# Patient Record
Sex: FEMALE | Race: WHITE | Employment: OTHER | URBAN - NONMETROPOLITAN AREA
[De-identification: names, ages, dates, MRNs, and addresses within clinical notes are randomized per-mention and may not be internally consistent; named-entity substitution may affect disease eponyms.]

---

## 2017-02-03 ENCOUNTER — OFFICE VISIT (OUTPATIENT)
Dept: PSYCHIATRY | Age: 72
End: 2017-02-03

## 2017-02-03 DIAGNOSIS — F31.81 BIPOLAR II DISORDER IN FULL REMISSION (HCC): Primary | ICD-10-CM

## 2017-02-03 PROCEDURE — 99213 OFFICE O/P EST LOW 20 MIN: CPT | Performed by: PSYCHIATRY & NEUROLOGY

## 2017-02-08 ENCOUNTER — TELEPHONE (OUTPATIENT)
Dept: ONCOLOGY | Age: 72
End: 2017-02-08

## 2017-02-13 ENCOUNTER — OFFICE VISIT (OUTPATIENT)
Dept: ONCOLOGY | Age: 72
End: 2017-02-13

## 2017-02-13 VITALS
HEIGHT: 70 IN | WEIGHT: 180.8 LBS | TEMPERATURE: 98 F | BODY MASS INDEX: 25.88 KG/M2 | DIASTOLIC BLOOD PRESSURE: 90 MMHG | HEART RATE: 84 BPM | OXYGEN SATURATION: 98 % | RESPIRATION RATE: 18 BRPM | SYSTOLIC BLOOD PRESSURE: 187 MMHG

## 2017-02-13 DIAGNOSIS — C50.412 MALIGNANT NEOPLASM OF UPPER-OUTER QUADRANT OF LEFT FEMALE BREAST (HCC): Primary | ICD-10-CM

## 2017-02-13 PROCEDURE — 99213 OFFICE O/P EST LOW 20 MIN: CPT | Performed by: INTERNAL MEDICINE

## 2017-03-13 ENCOUNTER — OFFICE VISIT (OUTPATIENT)
Dept: ONCOLOGY | Age: 72
End: 2017-03-13

## 2017-03-13 VITALS
SYSTOLIC BLOOD PRESSURE: 159 MMHG | OXYGEN SATURATION: 97 % | RESPIRATION RATE: 18 BRPM | BODY MASS INDEX: 26.23 KG/M2 | HEIGHT: 70 IN | TEMPERATURE: 97.3 F | HEART RATE: 72 BPM | DIASTOLIC BLOOD PRESSURE: 94 MMHG | WEIGHT: 183.2 LBS

## 2017-03-13 DIAGNOSIS — C50.412 MALIGNANT NEOPLASM OF UPPER-OUTER QUADRANT OF LEFT FEMALE BREAST (HCC): Primary | ICD-10-CM

## 2017-03-13 PROCEDURE — 99214 OFFICE O/P EST MOD 30 MIN: CPT | Performed by: INTERNAL MEDICINE

## 2017-03-21 DIAGNOSIS — Z12.31 ENCOUNTER FOR SCREENING MAMMOGRAM FOR HIGH-RISK PATIENT: ICD-10-CM

## 2017-03-21 DIAGNOSIS — Z85.3 HISTORY OF BREAST CANCER: Primary | ICD-10-CM

## 2017-07-25 DIAGNOSIS — Z12.31 ENCOUNTER FOR SCREENING MAMMOGRAM FOR HIGH-RISK PATIENT: ICD-10-CM

## 2017-07-25 DIAGNOSIS — Z85.3 HISTORY OF BREAST CANCER: ICD-10-CM

## 2017-08-03 ENCOUNTER — OFFICE VISIT (OUTPATIENT)
Dept: PSYCHIATRY | Age: 72
End: 2017-08-03
Payer: COMMERCIAL

## 2017-08-03 DIAGNOSIS — F31.81 BIPOLAR II DISORDER IN FULL REMISSION (HCC): Primary | ICD-10-CM

## 2017-08-03 PROCEDURE — 99212 OFFICE O/P EST SF 10 MIN: CPT | Performed by: PSYCHIATRY & NEUROLOGY

## 2017-08-03 RX ORDER — ARIPIPRAZOLE 10 MG/1
10 TABLET ORAL DAILY
Qty: 90 TABLET | Refills: 3 | Status: SHIPPED | OUTPATIENT
Start: 2017-08-03 | End: 2018-08-01 | Stop reason: SDUPTHER

## 2017-09-11 ENCOUNTER — OFFICE VISIT (OUTPATIENT)
Dept: ONCOLOGY | Age: 72
End: 2017-09-11
Payer: COMMERCIAL

## 2017-09-11 ENCOUNTER — HOSPITAL ENCOUNTER (OUTPATIENT)
Dept: INFUSION THERAPY | Age: 72
Discharge: HOME | End: 2017-09-11
Payer: COMMERCIAL

## 2017-09-11 VITALS
HEIGHT: 70 IN | TEMPERATURE: 97.7 F | SYSTOLIC BLOOD PRESSURE: 156 MMHG | BODY MASS INDEX: 25.17 KG/M2 | RESPIRATION RATE: 18 BRPM | DIASTOLIC BLOOD PRESSURE: 92 MMHG | WEIGHT: 175.8 LBS | OXYGEN SATURATION: 98 % | HEART RATE: 69 BPM

## 2017-09-11 DIAGNOSIS — C50.411 MALIGNANT NEOPLASM OF UPPER-OUTER QUADRANT OF RIGHT FEMALE BREAST (HCC): Primary | ICD-10-CM

## 2017-09-11 DIAGNOSIS — C50.412 MALIGNANT NEOPLASM OF UPPER-OUTER QUADRANT OF LEFT FEMALE BREAST (HCC): ICD-10-CM

## 2017-09-11 LAB
ALBUMIN SERPL-MCNC: 4.5 G/DL (ref 3.5–5.1)
ALP BLD-CCNC: 99 U/L (ref 38–126)
ALT SERPL-CCNC: 14 U/L (ref 11–66)
AST SERPL-CCNC: 17 U/L (ref 5–40)
BASINOPHIL, AUTOMATED: 1 % (ref 0–12)
BILIRUB SERPL-MCNC: 0.5 MG/DL (ref 0.3–1.2)
BILIRUBIN DIRECT: < 0.2 MG/DL (ref 0–0.3)
BUN, WHOLE BLOOD: 11 MG/DL (ref 8–26)
CHLORIDE, WHOLE BLOOD: 99 MEQ/L (ref 98–109)
CREATININE, WHOLE BLOOD: 0.6 MG/DL (ref 0.5–1.2)
EOSINOPHILS RELATIVE PERCENT: 3 % (ref 0–12)
GFR, ESTIMATED ,CON: > 90 ML/MIN/1.73M2
GLUCOSE, WHOLE BLOOD: 91 MG/DL (ref 70–108)
HCT VFR BLD CALC: 42.8 % (ref 37–47)
HEMOGLOBIN: 14.7 GM/DL (ref 12–16)
IONIZED CALCIUM, WHOLE BLOOD: 1.24 MMOL/L (ref 1.12–1.32)
LYMPHOCYTES # BLD: 28 % (ref 15–47)
MCH RBC QN AUTO: 32.5 PG (ref 27–31)
MCHC RBC AUTO-ENTMCNC: 34.4 GM/DL (ref 33–37)
MCV RBC AUTO: 95 FL (ref 81–99)
MONOCYTES: 8 % (ref 0–12)
PDW BLD-RTO: 11.2 % (ref 11.5–14.5)
PLATELET # BLD: 179 THOU/MM3 (ref 130–400)
PMV BLD AUTO: 8.7 MCM (ref 7.4–10.4)
POTASSIUM, WHOLE BLOOD: 4.3 MEQ/L (ref 3.5–4.9)
RBC # BLD: 4.52 MILL/MM3 (ref 4.2–5.4)
SEG NEUTROPHILS: 61 % (ref 43–75)
SODIUM, WHOLE BLOOD: 138 MEQ/L (ref 138–146)
TOTAL CO2, WHOLE BLOOD: 29 MEQ/L (ref 23–33)
TOTAL PROTEIN: 7.3 G/DL (ref 6.1–8)
WBC # BLD: 5.3 THOU/MM3 (ref 4.8–10.8)

## 2017-09-11 PROCEDURE — 99214 OFFICE O/P EST MOD 30 MIN: CPT | Performed by: INTERNAL MEDICINE

## 2017-09-11 PROCEDURE — 99211 OFF/OP EST MAY X REQ PHY/QHP: CPT

## 2017-09-11 PROCEDURE — 36415 COLL VENOUS BLD VENIPUNCTURE: CPT

## 2017-09-11 PROCEDURE — 85025 COMPLETE CBC W/AUTO DIFF WBC: CPT

## 2017-09-11 PROCEDURE — 80076 HEPATIC FUNCTION PANEL: CPT

## 2017-09-11 PROCEDURE — 80047 BASIC METABLC PNL IONIZED CA: CPT

## 2017-09-11 RX ORDER — AMLODIPINE BESYLATE 5 MG/1
5 TABLET ORAL DAILY
COMMUNITY
Start: 2017-07-12

## 2018-02-01 ENCOUNTER — OFFICE VISIT (OUTPATIENT)
Dept: PSYCHIATRY | Age: 73
End: 2018-02-01
Payer: COMMERCIAL

## 2018-02-01 DIAGNOSIS — F31.81 BIPOLAR II DISORDER IN FULL REMISSION (HCC): Primary | ICD-10-CM

## 2018-02-01 PROCEDURE — 99212 OFFICE O/P EST SF 10 MIN: CPT | Performed by: PSYCHIATRY & NEUROLOGY

## 2018-02-01 NOTE — PROGRESS NOTES
Chief Complaint   Patient presents with    [address] [facility] returned after six months to follow-up on type II bipolar disorder. As previously, she reports that she is symptom free. She remains on the Abilify 10 mg daily, has no side effect complaints, and good benefit. We will simply continue as is. She had no questions. She has ample refills. She does complain of word finding difficulty. This shows up in conversations. It is a relatively recent complaint. She does not have other areas of deficit functioning as far as I can tell. I did not see any examples today.     Mental Status Examination    Level of consciousness:  within normal limits  Appearance:  well-appearing, street clothes, in chair, good grooming and good hygiene  Behavior/Motor:  no abnormalities noted  Attitude toward examiner:  cooperative, attentive and good eye contact  Speech:  spontaneous, normal rate, normal volume and well articulated  Mood:  euthymic  Affect:  mood congruent  Thought processes:  linear, goal directed and coherent  Thought content:  Homocidal ideation: none  Suicidal Ideation:  denies suicidal ideation  Delusions:  no evidence of delusions  Perceptual Disturbance:  denies any perceptual disturbance  Cognition:  oriented to person, place, and time  Concentration succeeded  Memory intact  Fund of knowledge:  good  Abstract thinking:  good  Insight:  good  Judgment:  good    DIAGNOSTIC IMPRESSION  Bipolar II remission    Plan  No changes  Current Outpatient Prescriptions   Medication Sig Dispense Refill    amLODIPine (NORVASC) 5 MG tablet Take 5 mg by mouth daily       ARIPiprazole (ABILIFY) 10 MG tablet Take 1 tablet by mouth daily 90 tablet 3    letrozole (FEMARA) 2.5 MG tablet TAKE 1 TABLET EVERY DAY 90 tablet 3    letrozole (FEMARA) 2.5 MG tablet TAKE 1 TABLET EVERY DAY 90 tablet 3    levothyroxine (SYNTHROID) 88 MCG tablet Take 88 mcg by mouth Daily      simvastatin (ZOCOR) 40 MG

## 2018-02-26 RX ORDER — LETROZOLE 2.5 MG/1
TABLET, FILM COATED ORAL
Qty: 90 TABLET | Refills: 0 | Status: SHIPPED | OUTPATIENT
Start: 2018-02-26 | End: 2018-03-05 | Stop reason: SDUPTHER

## 2018-03-05 ENCOUNTER — HOSPITAL ENCOUNTER (OUTPATIENT)
Dept: INFUSION THERAPY | Age: 73
Discharge: HOME | End: 2018-03-05
Payer: COMMERCIAL

## 2018-03-05 ENCOUNTER — OFFICE VISIT (OUTPATIENT)
Dept: ONCOLOGY | Age: 73
End: 2018-03-05
Payer: COMMERCIAL

## 2018-03-05 VITALS
HEIGHT: 70 IN | RESPIRATION RATE: 18 BRPM | HEART RATE: 65 BPM | DIASTOLIC BLOOD PRESSURE: 88 MMHG | WEIGHT: 175.4 LBS | SYSTOLIC BLOOD PRESSURE: 146 MMHG | TEMPERATURE: 97.5 F | BODY MASS INDEX: 25.11 KG/M2 | OXYGEN SATURATION: 99 %

## 2018-03-05 DIAGNOSIS — C50.911 PRIMARY MALIGNANT NEOPLASM OF RIGHT BREAST WITH STAGE 3 NODAL METASTASIS PER AMERICAN JOINT COMMITTEE ON CANCER 7TH EDITION (N3) (HCC): Primary | ICD-10-CM

## 2018-03-05 DIAGNOSIS — I10 HYPERTENSION, UNSPECIFIED TYPE: ICD-10-CM

## 2018-03-05 DIAGNOSIS — C77.9 PRIMARY MALIGNANT NEOPLASM OF RIGHT BREAST WITH STAGE 3 NODAL METASTASIS PER AMERICAN JOINT COMMITTEE ON CANCER 7TH EDITION (N3) (HCC): Primary | ICD-10-CM

## 2018-03-05 DIAGNOSIS — C50.411 MALIGNANT NEOPLASM OF UPPER-OUTER QUADRANT OF RIGHT FEMALE BREAST (HCC): ICD-10-CM

## 2018-03-05 DIAGNOSIS — Z79.811 ENCOUNTER FOR MONITORING AROMATASE INHIBITOR THERAPY: ICD-10-CM

## 2018-03-05 DIAGNOSIS — Z51.81 ENCOUNTER FOR MONITORING AROMATASE INHIBITOR THERAPY: ICD-10-CM

## 2018-03-05 LAB
ALBUMIN SERPL-MCNC: 4.5 G/DL (ref 3.5–5.1)
ALP BLD-CCNC: 107 U/L (ref 38–126)
ALT SERPL-CCNC: 12 U/L (ref 11–66)
AST SERPL-CCNC: 18 U/L (ref 5–40)
BASINOPHIL, AUTOMATED: 0 % (ref 0–12)
BILIRUB SERPL-MCNC: 0.5 MG/DL (ref 0.3–1.2)
BILIRUBIN DIRECT: < 0.2 MG/DL (ref 0–0.3)
BUN, WHOLE BLOOD: 10 MG/DL (ref 8–26)
CHLORIDE, WHOLE BLOOD: 99 MEQ/L (ref 98–109)
CREATININE, WHOLE BLOOD: 0.6 MG/DL (ref 0.5–1.2)
EOSINOPHILS RELATIVE PERCENT: 2 % (ref 0–12)
GFR, ESTIMATED ,CON: > 90 ML/MIN/1.73M2
GLUCOSE, WHOLE BLOOD: 94 MG/DL (ref 70–108)
HCT VFR BLD CALC: 44.2 % (ref 37–47)
HEMOGLOBIN: 15 GM/DL (ref 12–16)
IONIZED CALCIUM, WHOLE BLOOD: 1.16 MMOL/L (ref 1.12–1.32)
LYMPHOCYTES # BLD: 27 % (ref 15–47)
MCH RBC QN AUTO: 32.2 PG (ref 27–31)
MCHC RBC AUTO-ENTMCNC: 33.9 GM/DL (ref 33–37)
MCV RBC AUTO: 95 FL (ref 81–99)
MONOCYTES: 6 % (ref 0–12)
PDW BLD-RTO: 11.4 % (ref 11.5–14.5)
PLATELET # BLD: 197 THOU/MM3 (ref 130–400)
PMV BLD AUTO: 9 FL (ref 7.4–10.4)
POTASSIUM, WHOLE BLOOD: 3.8 MEQ/L (ref 3.5–4.9)
RBC # BLD: 4.66 MILL/MM3 (ref 4.2–5.4)
SEG NEUTROPHILS: 64 % (ref 43–75)
SODIUM, WHOLE BLOOD: 139 MEQ/L (ref 138–146)
TOTAL CO2, WHOLE BLOOD: 30 MEQ/L (ref 23–33)
TOTAL PROTEIN: 7.6 G/DL (ref 6.1–8)
WBC # BLD: 6.5 THOU/MM3 (ref 4.8–10.8)

## 2018-03-05 PROCEDURE — 99211 OFF/OP EST MAY X REQ PHY/QHP: CPT

## 2018-03-05 PROCEDURE — 80076 HEPATIC FUNCTION PANEL: CPT

## 2018-03-05 PROCEDURE — 36415 COLL VENOUS BLD VENIPUNCTURE: CPT

## 2018-03-05 PROCEDURE — 99214 OFFICE O/P EST MOD 30 MIN: CPT | Performed by: INTERNAL MEDICINE

## 2018-03-05 PROCEDURE — 85025 COMPLETE CBC W/AUTO DIFF WBC: CPT

## 2018-03-05 PROCEDURE — 80047 BASIC METABLC PNL IONIZED CA: CPT

## 2018-03-05 NOTE — PROGRESS NOTES
Eyes: Negative. Respiratory: Negative. Cardiovascular: Negative. Gastrointestinal: Negative. Genitourinary: Negative. Musculoskeletal: Negative. Skin: Negative. Neurological: Negative. Hematological: Negative. Psychiatric/Behavioral: Negative. Objective:   Physical Exam   Vitals:    03/05/18 1349   BP: (!) 146/88   Pulse: 65   Resp: 18   Temp: 97.5 °F (36.4 °C)   SpO2: 99%   Vitals reviewed and are stable. Constitutional: Well-developed and well-nourished. No acute distress. HENT: Normocephalic and atraumatic. Eyes: Pupils are equal and reactive. No scleral icterus. Neck: Overall appearance is symmetrical. No identifiable masses. Chest: Bilateral breast exam displays no palpable abnormalities. Surgical incisions are well-healed. There is no evidence of any localized recurrence. Pulmonary: Effort normal. No respiratory distress. Cardiovascular: RRR. No edema in any of the four extremities. Abdominal: Soft. No hepatomegaly or splenomegaly. Musculoskeletal: Gait is normal. Muscle strength and tone good. Neurological: Alert and oriented to person, place, and time. Judgment and thought content normal.  Skin: Skin is warm and dry. No rash. Psychiatric: Mood and affect appropriate for the clinical situation. Behavior is normal.        Data Analysis:    Hematology 3/5/2018 9/11/2017 3/13/2017   WBC 6.5 5.3 4.2 (L)   RBC 4.66 4.52 4.81   HGB 15.0 14.7 15.5   HCT 44.2 42.8 46.1   MCV 95 95 96   RDW 11.4 (L) 11.2 (L) 11.3 (L)    179 190     Hematology 9/19/2016   WBC 4.0 (L)   RBC 4.38   HGB 14.1   HCT 41.1   MCV 94   RDW 10.9 (L)        Assessment:   1. Breast cancer. 2.  Use of aromatase inhibitor (Femara). 3.  Hypertension. Plan:   1. Continue Femara, 2.5 mg by mouth daily. 2.  Monitor blood pressure and follow up with primary care provider for further surveillance and management. 3.  Monitor for side effects and toxicity from Femara.   4.  Monitor for recurrence of malignancy.      Roverto Miller [place] [address], 1 [facility], [address] [place] [address], [address] [place] [address]

## 2018-03-15 PROBLEM — I10 HYPERTENSION: Status: ACTIVE | Noted: 2018-03-15

## 2018-08-01 ENCOUNTER — OFFICE VISIT (OUTPATIENT)
Dept: PSYCHIATRY | Age: 73
End: 2018-08-01
Payer: COMMERCIAL

## 2018-08-01 DIAGNOSIS — F31.81 BIPOLAR II DISORDER IN FULL REMISSION (HCC): Primary | ICD-10-CM

## 2018-08-01 PROCEDURE — 99212 OFFICE O/P EST SF 10 MIN: CPT | Performed by: PSYCHIATRY & NEUROLOGY

## 2018-08-01 RX ORDER — ARIPIPRAZOLE 10 MG/1
10 TABLET ORAL DAILY
Qty: 90 TABLET | Refills: 3 | Status: SHIPPED | OUTPATIENT
Start: 2018-08-01 | End: 2019-08-05 | Stop reason: SDUPTHER

## 2018-08-01 NOTE — PROGRESS NOTES
Chief Complaint   Patient presents with    [address] returned after six months for follow-up of bipolar type II depression. As previously she remains completely asymptomatic and in full remission. She denies any problems with the medication itself. She is not having side effects or other issues. She is due for a refill today and uses a mail in pharmacy. We did submit a one-year prescription to them unchanged. I did not make any change in her regimen. Mental Status Examination    Level of consciousness:  within normal limits  Appearance:  well-appearing, street clothes, in chair, good grooming and good hygiene  Behavior/Motor:  no abnormalities noted  Attitude toward examiner:  cooperative, attentive and good eye contact  Speech:  spontaneous, normal rate, normal volume and well articulated  Mood:  euthymic  Affect:  mood congruent  Thought processes:  linear, goal directed and coherent  Thought content:  Homocidal ideation: none  Suicidal Ideation:  denies suicidal ideation  Delusions:  no evidence of delusions  Perceptual Disturbance:  denies any perceptual disturbance  Cognition:  oriented to person, place, and time  Concentration succeeded  Memory intact  Fund of knowledge:  good  Abstract thinking:  good  Insight:  good  Judgment:  good    DIAGNOSTIC IMPRESSION  Bipolar II full remission    Plan  No changes  Current Outpatient Prescriptions   Medication Sig Dispense Refill    ARIPiprazole (ABILIFY) 10 MG tablet Take 1 tablet by mouth daily 90 tablet 3    letrozole (FEMARA) 2.5 MG tablet TAKE 1 TABLET EVERY DAY 90 tablet 0    amLODIPine (NORVASC) 5 MG tablet Take 5 mg by mouth daily       letrozole (FEMARA) 2.5 MG tablet TAKE 1 TABLET EVERY DAY 90 tablet 3    levothyroxine (SYNTHROID) 88 MCG tablet Take 88 mcg by mouth Daily      simvastatin (ZOCOR) 40 MG tablet Take 40 mg by mouth nightly.       Multiple Vitamins-Minerals (MULTIVITAMIN PO) Take  by mouth

## 2018-09-10 ENCOUNTER — OFFICE VISIT (OUTPATIENT)
Dept: ONCOLOGY | Age: 73
End: 2018-09-10
Payer: MEDICARE

## 2018-09-10 ENCOUNTER — HOSPITAL ENCOUNTER (OUTPATIENT)
Dept: INFUSION THERAPY | Age: 73
Discharge: HOME | End: 2018-09-10
Payer: MEDICARE

## 2018-09-10 VITALS
BODY MASS INDEX: 25.03 KG/M2 | RESPIRATION RATE: 16 BRPM | SYSTOLIC BLOOD PRESSURE: 116 MMHG | DIASTOLIC BLOOD PRESSURE: 70 MMHG | TEMPERATURE: 97.5 F | OXYGEN SATURATION: 99 % | HEIGHT: 70 IN | WEIGHT: 174.8 LBS | HEART RATE: 65 BPM

## 2018-09-10 DIAGNOSIS — C50.911 PRIMARY MALIGNANT NEOPLASM OF RIGHT BREAST WITH STAGE 3 NODAL METASTASIS PER AMERICAN JOINT COMMITTEE ON CANCER 7TH EDITION (N3) (HCC): ICD-10-CM

## 2018-09-10 DIAGNOSIS — C77.9 PRIMARY MALIGNANT NEOPLASM OF RIGHT BREAST WITH STAGE 3 NODAL METASTASIS PER AMERICAN JOINT COMMITTEE ON CANCER 7TH EDITION (N3) (HCC): ICD-10-CM

## 2018-09-10 DIAGNOSIS — Z79.811 ENCOUNTER FOR MONITORING AROMATASE INHIBITOR THERAPY: ICD-10-CM

## 2018-09-10 DIAGNOSIS — C50.411 MALIGNANT NEOPLASM OF UPPER-OUTER QUADRANT OF RIGHT FEMALE BREAST, UNSPECIFIED ESTROGEN RECEPTOR STATUS (HCC): Primary | ICD-10-CM

## 2018-09-10 DIAGNOSIS — Z51.81 ENCOUNTER FOR MONITORING AROMATASE INHIBITOR THERAPY: ICD-10-CM

## 2018-09-10 LAB
ALBUMIN SERPL-MCNC: 4.7 G/DL (ref 3.5–5.1)
ALP BLD-CCNC: 90 U/L (ref 38–126)
ALT SERPL-CCNC: 13 U/L (ref 11–66)
AST SERPL-CCNC: 18 U/L (ref 5–40)
BASINOPHIL, AUTOMATED: 1 % (ref 0–3)
BILIRUB SERPL-MCNC: 0.5 MG/DL (ref 0.3–1.2)
BILIRUBIN DIRECT: < 0.2 MG/DL (ref 0–0.3)
BUN, WHOLE BLOOD: 10 MG/DL (ref 8–26)
CHLORIDE, WHOLE BLOOD: 99 MEQ/L (ref 98–109)
CREATININE, WHOLE BLOOD: 0.6 MG/DL (ref 0.5–1.2)
EOSINOPHILS RELATIVE PERCENT: 2 % (ref 0–4)
GFR, ESTIMATED ,CON: > 90 ML/MIN/1.73M2
GLUCOSE, WHOLE BLOOD: 94 MG/DL (ref 70–108)
HCT VFR BLD CALC: 43.9 % (ref 37–47)
HEMOGLOBIN: 14.9 GM/DL (ref 12–16)
IONIZED CALCIUM, WHOLE BLOOD: 1.19 MMOL/L (ref 1.12–1.32)
LYMPHOCYTES # BLD: 26 % (ref 15–47)
MCH RBC QN AUTO: 32.4 PG (ref 27–31)
MCHC RBC AUTO-ENTMCNC: 33.9 GM/DL (ref 33–37)
MCV RBC AUTO: 96 FL (ref 81–99)
MONOCYTES: 7 % (ref 0–12)
PDW BLD-RTO: 11.2 % (ref 11.5–14.5)
PLATELET # BLD: 189 THOU/MM3 (ref 130–400)
PMV BLD AUTO: 9.3 FL (ref 7.4–10.4)
POTASSIUM, WHOLE BLOOD: 3.7 MEQ/L (ref 3.5–4.9)
RBC # BLD: 4.58 MILL/MM3 (ref 4.2–5.4)
SEG NEUTROPHILS: 64 % (ref 43–75)
SODIUM, WHOLE BLOOD: 139 MEQ/L (ref 138–146)
TOTAL CO2, WHOLE BLOOD: 29 MEQ/L (ref 23–33)
TOTAL PROTEIN: 7.6 G/DL (ref 6.1–8)
WBC # BLD: 6.9 THOU/MM3 (ref 4.8–10.8)

## 2018-09-10 PROCEDURE — 99214 OFFICE O/P EST MOD 30 MIN: CPT | Performed by: INTERNAL MEDICINE

## 2018-09-10 PROCEDURE — 99211 OFF/OP EST MAY X REQ PHY/QHP: CPT

## 2018-09-10 PROCEDURE — 85025 COMPLETE CBC W/AUTO DIFF WBC: CPT

## 2018-09-10 PROCEDURE — 80076 HEPATIC FUNCTION PANEL: CPT

## 2018-09-10 PROCEDURE — 80047 BASIC METABLC PNL IONIZED CA: CPT

## 2018-09-10 PROCEDURE — 36415 COLL VENOUS BLD VENIPUNCTURE: CPT

## 2018-09-10 RX ORDER — LETROZOLE 2.5 MG/1
TABLET, FILM COATED ORAL
Qty: 90 TABLET | Refills: 3 | Status: SHIPPED | OUTPATIENT
Start: 2018-09-10 | End: 2022-08-29 | Stop reason: ALTCHOICE

## 2018-09-10 NOTE — PROGRESS NOTES
[place]  [facility]. [address], [address], [address] [place] [address]

## 2018-09-16 DIAGNOSIS — C50.911 PRIMARY MALIGNANT NEOPLASM OF RIGHT BREAST WITH STAGE 3 NODAL METASTASIS PER AMERICAN JOINT COMMITTEE ON CANCER 7TH EDITION (N3) (HCC): ICD-10-CM

## 2018-09-16 DIAGNOSIS — C77.9 PRIMARY MALIGNANT NEOPLASM OF RIGHT BREAST WITH STAGE 3 NODAL METASTASIS PER AMERICAN JOINT COMMITTEE ON CANCER 7TH EDITION (N3) (HCC): ICD-10-CM

## 2018-09-17 RX ORDER — LETROZOLE 2.5 MG/1
TABLET, FILM COATED ORAL
Qty: 90 TABLET | Refills: 0 | Status: SHIPPED | OUTPATIENT
Start: 2018-09-17 | End: 2019-03-11 | Stop reason: SDUPTHER

## 2019-02-04 ENCOUNTER — OFFICE VISIT (OUTPATIENT)
Dept: PSYCHIATRY | Age: 74
End: 2019-02-04
Payer: MEDICARE

## 2019-02-04 DIAGNOSIS — F31.81 BIPOLAR II DISORDER IN FULL REMISSION (HCC): Primary | ICD-10-CM

## 2019-02-04 PROCEDURE — 99212 OFFICE O/P EST SF 10 MIN: CPT | Performed by: PSYCHIATRY & NEUROLOGY

## 2019-03-11 ENCOUNTER — HOSPITAL ENCOUNTER (OUTPATIENT)
Dept: INFUSION THERAPY | Age: 74
Discharge: HOME | End: 2019-03-11
Payer: MEDICARE

## 2019-03-11 ENCOUNTER — OFFICE VISIT (OUTPATIENT)
Dept: ONCOLOGY | Age: 74
End: 2019-03-11
Payer: MEDICARE

## 2019-03-11 VITALS
SYSTOLIC BLOOD PRESSURE: 140 MMHG | DIASTOLIC BLOOD PRESSURE: 83 MMHG | OXYGEN SATURATION: 98 % | RESPIRATION RATE: 18 BRPM | HEART RATE: 71 BPM | WEIGHT: 177.8 LBS | BODY MASS INDEX: 25.45 KG/M2 | HEIGHT: 70 IN | TEMPERATURE: 98 F

## 2019-03-11 DIAGNOSIS — C50.411 MALIGNANT NEOPLASM OF UPPER-OUTER QUADRANT OF RIGHT FEMALE BREAST, UNSPECIFIED ESTROGEN RECEPTOR STATUS (HCC): ICD-10-CM

## 2019-03-11 DIAGNOSIS — C50.411 MALIGNANT NEOPLASM OF UPPER-OUTER QUADRANT OF RIGHT FEMALE BREAST, UNSPECIFIED ESTROGEN RECEPTOR STATUS (HCC): Primary | ICD-10-CM

## 2019-03-11 DIAGNOSIS — Z51.81 ENCOUNTER FOR MONITORING AROMATASE INHIBITOR THERAPY: ICD-10-CM

## 2019-03-11 DIAGNOSIS — Z79.811 ENCOUNTER FOR MONITORING AROMATASE INHIBITOR THERAPY: ICD-10-CM

## 2019-03-11 LAB
ALBUMIN SERPL-MCNC: 4.8 G/DL (ref 3.5–5.1)
ALP BLD-CCNC: 99 U/L (ref 38–126)
ALT SERPL-CCNC: 15 U/L (ref 11–66)
AST SERPL-CCNC: 19 U/L (ref 5–40)
BASOPHILS # BLD: 0.9 %
BASOPHILS ABSOLUTE: 0.1 THOU/MM3 (ref 0–0.1)
BILIRUB SERPL-MCNC: 0.5 MG/DL (ref 0.3–1.2)
BILIRUBIN DIRECT: < 0.2 MG/DL (ref 0–0.3)
BUN, WHOLE BLOOD: 10 MG/DL (ref 8–26)
CHLORIDE, WHOLE BLOOD: 98 MEQ/L (ref 98–109)
CREATININE, WHOLE BLOOD: 0.6 MG/DL (ref 0.5–1.2)
EOSINOPHIL # BLD: 2.1 %
EOSINOPHILS ABSOLUTE: 0.1 THOU/MM3 (ref 0–0.4)
GFR, ESTIMATED ,CON: > 90 ML/MIN/1.73M2
GLUCOSE, WHOLE BLOOD: 91 MG/DL (ref 70–108)
HCT VFR BLD CALC: 46.3 % (ref 37–47)
HEMOGLOBIN: 15.2 GM/DL (ref 12–16)
IMMATURE GRANS (ABS): 0.02 THOU/MM3 (ref 0–0.07)
IMMATURE GRANULOCYTES: 0.3 %
IONIZED CALCIUM, WHOLE BLOOD: 1.19 MMOL/L (ref 1.12–1.32)
LYMPHOCYTES # BLD: 22.1 %
LYMPHOCYTES ABSOLUTE: 1.5 THOU/MM3 (ref 1–4.8)
MCH RBC QN AUTO: 31.3 PG (ref 27–31)
MCHC RBC AUTO-ENTMCNC: 32.7 GM/DL (ref 33–37)
MCV RBC AUTO: 95 FL (ref 81–99)
MONOCYTES # BLD: 6.9 %
MONOCYTES ABSOLUTE: 0.5 THOU/MM3 (ref 0.4–1.3)
NUCLEATED RED BLOOD CELLS: 0 /100 WBC
PDW BLD-RTO: 10.6 % (ref 11.5–14.5)
PLATELET # BLD: 193 THOU/MM3 (ref 130–400)
PMV BLD AUTO: 9 FL (ref 7.4–10.4)
POTASSIUM, WHOLE BLOOD: 3.9 MEQ/L (ref 3.5–4.9)
RBC # BLD: 4.85 MILL/MM3 (ref 4.2–5.4)
SEG NEUTROPHILS: 67.7 %
SEGMENTED NEUTROPHILS ABSOLUTE COUNT: 4.5 THOU/MM3 (ref 1.8–7.7)
SODIUM, WHOLE BLOOD: 140 MEQ/L (ref 138–146)
TOTAL CO2, WHOLE BLOOD: 30 MEQ/L (ref 23–33)
TOTAL PROTEIN: 7.6 G/DL (ref 6.1–8)
WBC # BLD: 6.7 THOU/MM3 (ref 4.8–10.8)

## 2019-03-11 PROCEDURE — 36415 COLL VENOUS BLD VENIPUNCTURE: CPT

## 2019-03-11 PROCEDURE — 80076 HEPATIC FUNCTION PANEL: CPT

## 2019-03-11 PROCEDURE — 80047 BASIC METABLC PNL IONIZED CA: CPT

## 2019-03-11 PROCEDURE — 99211 OFF/OP EST MAY X REQ PHY/QHP: CPT

## 2019-03-11 PROCEDURE — 99214 OFFICE O/P EST MOD 30 MIN: CPT | Performed by: INTERNAL MEDICINE

## 2019-03-11 PROCEDURE — 85025 COMPLETE CBC W/AUTO DIFF WBC: CPT

## 2019-03-11 RX ORDER — OFLOXACIN 3 MG/ML
SOLUTION/ DROPS OPHTHALMIC
Refills: 0 | COMMUNITY
Start: 2019-02-05 | End: 2022-08-29 | Stop reason: ALTCHOICE

## 2019-03-11 RX ORDER — LATANOPROST 50 UG/ML
SOLUTION/ DROPS OPHTHALMIC
Refills: 0 | COMMUNITY
Start: 2019-02-14

## 2019-03-11 RX ORDER — PREDNISOLONE ACETATE 10 MG/ML
SUSPENSION/ DROPS OPHTHALMIC
Refills: 0 | COMMUNITY
Start: 2019-02-05 | End: 2022-08-29 | Stop reason: ALTCHOICE

## 2019-08-05 ENCOUNTER — OFFICE VISIT (OUTPATIENT)
Dept: PSYCHIATRY | Age: 74
End: 2019-08-05
Payer: MEDICARE

## 2019-08-05 DIAGNOSIS — F31.81 BIPOLAR II DISORDER IN FULL REMISSION (HCC): Primary | ICD-10-CM

## 2019-08-05 PROCEDURE — 99212 OFFICE O/P EST SF 10 MIN: CPT | Performed by: PSYCHIATRY & NEUROLOGY

## 2019-08-05 RX ORDER — ARIPIPRAZOLE 10 MG/1
10 TABLET ORAL DAILY
Qty: 90 TABLET | Refills: 3 | Status: SHIPPED | OUTPATIENT
Start: 2019-08-05 | End: 2020-03-24 | Stop reason: SDUPTHER

## 2020-03-16 ENCOUNTER — OFFICE VISIT (OUTPATIENT)
Dept: ONCOLOGY | Age: 75
End: 2020-03-16

## 2020-03-16 PROCEDURE — 99999 PR OFFICE/OUTPT VISIT,PROCEDURE ONLY: CPT | Performed by: INTERNAL MEDICINE

## 2020-03-17 NOTE — PROGRESS NOTES
[facility] CANCER CENTER  CANCER NETWORK OF [facility]  [facility] [address] [facility]  [address], [facility] [address] [facility] [address]  Dept: [phone]  Dept Fax: [phone]: [phone]    03/16/2020    Caitlin Ramírez     This patient did not come for the scheduled clinic appointment today. Celia Mo M.D. Medical Director: [facility]  Cancer Network [facility]  [address], [address] [place] [address], [address] [place] [address], [address] [place] [address] of the [facility] at [facility]      **This report has been created using voice recognition software. It may contain minor errors which are inherent in voice recognition technology. **

## 2020-03-24 RX ORDER — ARIPIPRAZOLE 10 MG/1
10 TABLET ORAL DAILY
Qty: 90 TABLET | Refills: 0 | Status: SHIPPED | OUTPATIENT
Start: 2020-03-24 | End: 2020-07-20

## 2020-06-22 ENCOUNTER — VIRTUAL VISIT (OUTPATIENT)
Dept: PSYCHIATRY | Age: 75
End: 2020-06-22
Payer: MEDICARE

## 2020-06-22 PROCEDURE — 99442 PR PHYS/QHP TELEPHONE EVALUATION 11-20 MIN: CPT | Performed by: PSYCHIATRY & NEUROLOGY

## 2020-07-07 ENCOUNTER — HOSPITAL ENCOUNTER (OUTPATIENT)
Dept: INFUSION THERAPY | Age: 75
Discharge: HOME | End: 2020-07-07
Payer: MEDICARE

## 2020-07-07 ENCOUNTER — OFFICE VISIT (OUTPATIENT)
Dept: ONCOLOGY | Age: 75
End: 2020-07-07
Payer: MEDICARE

## 2020-07-07 VITALS
SYSTOLIC BLOOD PRESSURE: 137 MMHG | WEIGHT: 186.2 LBS | TEMPERATURE: 98 F | HEART RATE: 70 BPM | BODY MASS INDEX: 26.07 KG/M2 | DIASTOLIC BLOOD PRESSURE: 82 MMHG | HEIGHT: 71 IN | OXYGEN SATURATION: 99 % | RESPIRATION RATE: 20 BRPM

## 2020-07-07 DIAGNOSIS — C50.411 MALIGNANT NEOPLASM OF UPPER-OUTER QUADRANT OF RIGHT FEMALE BREAST, UNSPECIFIED ESTROGEN RECEPTOR STATUS (HCC): ICD-10-CM

## 2020-07-07 LAB
ALBUMIN SERPL-MCNC: 4 G/DL (ref 3.5–5.1)
ALP BLD-CCNC: 80 U/L (ref 38–126)
ALT SERPL-CCNC: 14 U/L (ref 11–66)
AST SERPL-CCNC: 19 U/L (ref 5–40)
BILIRUB SERPL-MCNC: 0.3 MG/DL (ref 0.3–1.2)
BILIRUBIN DIRECT: < 0.2 MG/DL (ref 0–0.3)
BUN BLDV-MCNC: 11 MG/DL (ref 7–22)
CHLORIDE, WHOLE BLOOD: 102 MEQ/L (ref 98–109)
CO2: 25 MEQ/L (ref 23–33)
CREATININE, WHOLE BLOOD: 0.8 MG/DL (ref 0.5–1.2)
GFR, ESTIMATED ,CON: 75 ML/MIN/1.73M2
GLUCOSE, WHOLE BLOOD: 96 MG/DL (ref 70–108)
HCT VFR BLD CALC: 41.4 % (ref 37–47)
HEMOGLOBIN: 14.2 GM/DL (ref 12–16)
IONIZED CALCIUM, WHOLE BLOOD: 1.13 MMOL/L (ref 1.12–1.32)
MCH RBC QN AUTO: 32.6 PG (ref 26–33)
MCHC RBC AUTO-ENTMCNC: 34.3 GM/DL (ref 32.2–35.5)
MCV RBC AUTO: 95 FL (ref 81–99)
PDW BLD-RTO: 12.9 % (ref 11.5–14.5)
PLATELET # BLD: 227 THOU/MM3 (ref 130–400)
PMV BLD AUTO: 11 FL (ref 9.4–12.4)
POTASSIUM, WHOLE BLOOD: 3.8 MEQ/L (ref 3.5–4.9)
RBC # BLD: 4.36 MILL/MM3 (ref 4.2–5.4)
SEG NEUTROPHILS: 60 % (ref 43–75)
SEGMENTED NEUTROPHILS ABSOLUTE COUNT: 4.1 THOU/MM3 (ref 1.8–7.7)
SODIUM, WHOLE BLOOD: 138 MEQ/L (ref 138–146)
TOTAL PROTEIN: 6.6 G/DL (ref 6.1–8)
WBC # BLD: 6.9 THOU/MM3 (ref 4.8–10.8)

## 2020-07-07 PROCEDURE — 82374 ASSAY BLOOD CARBON DIOXIDE: CPT

## 2020-07-07 PROCEDURE — 85027 COMPLETE CBC AUTOMATED: CPT

## 2020-07-07 PROCEDURE — 84520 ASSAY OF UREA NITROGEN: CPT

## 2020-07-07 PROCEDURE — 99213 OFFICE O/P EST LOW 20 MIN: CPT | Performed by: INTERNAL MEDICINE

## 2020-07-07 PROCEDURE — 36415 COLL VENOUS BLD VENIPUNCTURE: CPT

## 2020-07-07 PROCEDURE — 80047 BASIC METABLC PNL IONIZED CA: CPT

## 2020-07-07 PROCEDURE — 99211 OFF/OP EST MAY X REQ PHY/QHP: CPT

## 2020-07-07 PROCEDURE — 80076 HEPATIC FUNCTION PANEL: CPT

## 2020-07-07 NOTE — PROGRESS NOTES
[facility] CENTER  CANCER NETWORK OF [facility]  [facility]OGY SPECIALISTS OF  [facility] [address] [facility] 98  [address], [facility] [address] [facility] [address]  Dept: [phone]  Dept Fax: [phone]  Loc: [phone]    Subjective:      Chief Complaint:  Walter Dias is a 69 y.o.  with breast cancer. The patient found a mass in her right breast in July 2014 and a mammogram confirmed an abnormality. A biopsy was completed on July 25, 2014 and surgical pathology from the right breast biopsy demonstrated a grade 2 infiltrating ductal carcinoma. The tumor was noted to be estrogen receptor positive, progesterone receptor positive, and HER-2/laura was not over amplified. The patient had a lumpectomy and right axillary node dissection completed in August 2014. The tumor measured 3.4 cm in greatest diameter. The sentinel lymph node was positive as were two additional lymph nodes. A total of twelve lymph nodes were resected. Oncotype DX testing performed revealed a recurrent score of 18. The patient opted to participate in clinical trial number  she was randomized to hormonal therapy only. The patient received breast radiation therapy in October and November 2014. She was then started on therapy with Femara, 2.5 mg. HPI: Leola Masterson is here today for follow-up regarding her history of breast cancer. On today's evaluation she presents stating that she feels well and has no signs or symptoms of be suggestive recurrence of her malignancy. Her review of systems is essentially unremarkable. The patient did complete a 5-year course of aromatase inhibitor therapy with Femara. Since completing therapy with Femara the patient has no change in her overall sense wellbeing and medical condition. The patient's not had fever, cough, shortness of breath or other signs of infection. She denies skeletal pain. Both her bowel and bladder habits have been stable.   The patient is due for a Medical Director: [facility]  [facility] Network [facility]  [address], 1 [facility], [address] [place] [address], [address] [place] [address], [address] of the [facility] at [facility]      **This report has been created using voice recognition software. It may contain minor errors which are inherent in voice recognition technology. **

## 2020-07-20 RX ORDER — ARIPIPRAZOLE 10 MG/1
10 TABLET ORAL DAILY
Qty: 90 TABLET | Refills: 1 | Status: SHIPPED | OUTPATIENT
Start: 2020-07-20 | End: 2021-02-08 | Stop reason: SDUPTHER

## 2021-02-08 ENCOUNTER — OFFICE VISIT (OUTPATIENT)
Dept: PSYCHIATRY | Age: 76
End: 2021-02-08
Payer: MEDICARE

## 2021-02-08 DIAGNOSIS — F31.81 BIPOLAR II DISORDER IN FULL REMISSION (HCC): Primary | ICD-10-CM

## 2021-02-08 PROCEDURE — 99215 OFFICE O/P EST HI 40 MIN: CPT | Performed by: REGISTERED NURSE

## 2021-02-08 RX ORDER — ARIPIPRAZOLE 10 MG/1
10 TABLET ORAL DAILY
Qty: 90 TABLET | Refills: 1 | Status: SHIPPED | OUTPATIENT
Start: 2021-02-08 | End: 2021-07-20 | Stop reason: SDUPTHER

## 2021-02-08 NOTE — PROGRESS NOTES
This note will not be viewable in [employer] for the following reason(s). This is a Psychotherapy Note. [address] [place] [address]  Dept: [phone]  Dept Fax: [phone]  Loc: [phone]    Visit Date: 2/8/2021    SUBJECTIVE DATA     CHIEF COMPLAINT:    Chief Complaint   Patient presents with    Follow-up    Medication Refill       History obtained from: patient    HISTORY OF PRESENT ILLNESS:    Mart Peguero is a 77 y.o. female who presents to the office for a follow-up appointment. She is a transfer patient from Dr. Real Lisa and is a new patient to this provider. [facility] reports she has been doing really well since she was last in to see Dr. Real Lisa. Started taking Prevagen July 2, 2020. Feels it has been helping; states \"I feel less cloudy and foggy\". She denies any changes since her last visit with Dr. Real Lisa. Reports she continues to be happy with her medication and does not wish to change anything. Denies depressive symptoms  Denies anxiety  Denies thoughts to harm self  Denies homicidal ideations  Denies hallucinations  Denies delusional thinking  Denies manic symptoms    Depression  Patient denies anhedonia, depressed mood, difficulty concentrating, fatigue, feelings of worthlessness/guilt, hopelessness, hypersomnia, impaired memory, insomnia, psychomotor agitation, psychomotor retardation, recurrent thoughts of death, suicidal attempt, suicidal thoughts with specific plan, suicidal thoughts without plan, weight gain and weight loss. Family history significant for no psychiatric illness. Possible organic causes contributing are: none. Risk factors: none. Previous treatment includes medication. She complains of the following side effects from the treatment: none.       PSYCHIATRIC HISTORY:  Patient has had prior care with the following:    [x] Psychiatrist    [] Psychologist    [] Other Therapist    [] None    The patient has had 0 lifetime suicide attempts. Patient reports 1 psych hospital admissions with the last admission taking place 2006 2006: Reports she had started feeling depressed, couldn't think clearly and stopped eating. States \"I was a mess. I can't even remember how bad it was because I was like a different person\". Unable to fully explain her symptoms, only that she \"just wasn't right\". She reports she delayed going to the ED because she wanted to wait for her grandson to be born, but she wasn't able to carry on long enough. Her family encouraged her to get help and her grandson was born while she she was admitted to . She reports she never had a similar episode before or after this.      Past psychiatric medications include:   \"can't remember\"    Adverse reactions from psychotropic medications:    denies       Lifetime Psychiatric Review of Systems         Dalia or Hypomania:  no     Panic Attacks:  no     Phobias:  no     Obsessions and Compulsions:  no     Body or Vocal Tics:  no     Hallucinations:  no     Delusions:  no    SOCIAL HISTORY:  Patient was born in [place] and raised by her biological parents      Social History     Socioeconomic History    Marital status:      Spouse name: Not on file    Number of children: Not on file    Years of education: Not on file    Highest education level: Not on file   Occupational History    Not on file   Social Needs    Financial resource strain: Not on file    Food insecurity     Worry: Not on file     Inability: Not on file   [employer] needs     Medical: Not on file     Non-medical: Not on file   Tobacco Use    Smoking status: Never Smoker    Smokeless tobacco: Never Used   Substance and Sexual Activity    Alcohol use: Yes     Comment: socially    Drug use: No    Sexual activity: Not on file   Lifestyle    Physical activity     Days per week: Not on file     Minutes per session: Not on file    Stress: Not on file Relationships    Social connections     Talks on phone: Not on file     Gets together: Not on file     Attends Worship service: Not on file     Active member of club or organization: Not on file     Attends meetings of clubs or organizations: Not on file     Relationship status: Not on file    Intimate partner violence     Fear of current or ex partner: Not on file     Emotionally abused: Not on file     Physically abused: Not on file     Forced sexual activity: Not on file   Other Topics Concern    Not on file   Social History Narrative    Not on file       FAMILY HISTORY:   Family History   Problem Relation Age of Onset    Cancer Mother     Heart Disease Mother     Heart Disease Father     Heart Disease Sister     Heart Disease Brother        Psychiatric Family History  Depression in son--is taking Latuda and something else [place] can't remember    PAST MEDICAL HISTORY:    Past Medical History:   Diagnosis Date    Cancer ([facility] [address]     Breast    Hypertension        PAST SURGICAL HISTORY:    Past Surgical History:   Procedure Laterality Date    BREAST BIOPSY      KNEE SURGERY      LUNG BIOPSY         PREVIOUSMEDICATIONS:  Outpatient Medications Prior to Visit   Medication Sig Dispense Refill    ARIPiprazole (ABILIFY) 10 MG tablet Take 1 tablet by mouth daily 90 tablet 1    Apoaequorin (PREVAGEN PO) Take 1 tablet by mouth      MULTIPLE VITAMIN PO Take 1 tablet by mouth      prednisoLONE acetate (PRED FORTE) 1 % ophthalmic suspension INSTILL 1 DROP IN LEFT/RIGHT EYE FOUR TIMES DAILY *DO NOT USE UNTIL INSTRUCTED THE DAY OF SURGERY  0    ofloxacin (OCUFLOX) 0.3 % solution INSTILL 1 DROP IN LEFT/RIGHT EYE 4 TIMES DAILY  0    latanoprost (XALATAN) 0.005 % ophthalmic solution   0    letrozole (FEMARA) 2.5 MG tablet TAKE 1 TABLET EVERY DAY (Patient not taking: Reported on 7/7/2020) 90 tablet 3    amLODIPine (NORVASC) 5 MG tablet Take 5 mg by mouth daily       levothyroxine (SYNTHROID) 88 MCG tablet Take 88 mcg by mouth Daily      simvastatin (ZOCOR) 40 MG tablet Take 40 mg by mouth nightly.  Multiple Vitamins-Minerals (MULTIVITAMIN PO) Take  by mouth daily. No facility-administered medications prior to visit. ALLERGIES:    Patient has no known allergies. REVIEW OF SYSTEMS:    Review of Systems    The patient sees Saida Olguin MD as her primary care provider. SPECIALISTS: none    OBJECTIVE DATA     There were no vitals taken for this visit. Physical Exam    Mental Status Evaluation:   Orientation: Alert, oriented, thought content appropriate   Mood:. Within Normal Limits      Affect:  Normal      Appearance:  Age Appropriate, Casually Dressed, Within Normal Limits, Clean and Well Groomed   Activity:  Within Normal Limits, Cooperative, Good Eye Contact and Seated Calmly   Gait/Posture: Normal   Speech:  Clear, Fluent, Normal Pitch and Volume, Age and Situation Appropriate   Thought Process: Within Normal Limits   Thought Content: Within Normal Limits   Cognition:  Grossly Intact   Memory: Intact   Insight:  Age Appropriate and Good   Judgment: Age Appropriate and Good   Suicidal Ideations: Denies Suicidal Ideation   Homicidal Ideations: Negative for homicidal ideation   Medication Side Effects: Absent       Attention Span Attention span and concentration were age appropriate       Screenings Completed in This Encounter:     Anxiety and Depression:  none                 No flowsheet data found. Interpretation of AMANDA-7 score: 5-9 = mild anxiety, 10-14 = moderate anxiety, 15+ = severe anxiety. Recommend referral to behavioral health for scores 10 or greater. DIAGNOSIS AND ASSESSMENT DATA     DIAGNOSIS:   1. Bipolar II disorder in full remission ([facility] [address]        PLAN   Follow-up:  Return in about 6 months (around 8/8/2021), or if symptoms worsen or fail to improve, for medication management, follow-up.     Continue Abilify 10mg as prescribed    Prescriptions for this encounter:  New Prescriptions    No medications on file       Orders Placed This Encounter   Medications    ARIPiprazole (ABILIFY) 10 MG tablet     Sig: Take 1 tablet by mouth daily     Dispense:  90 tablet     Refill:  1       Medications Discontinued During This Encounter   Medication Reason    ARIPiprazole (ABILIFY) 10 MG tablet REORDER       Additional orders:  No orders of the defined types were placed in this encounter. Risks, potential side effects, possibledrug-drug interactions, benefits and alternate treatments discussed in detail. All questions answered. Patient stated understanding and is agreeable to treatment plan. Patient has been instructed to seek emergency help via the emergency and/or calling 911 should symptoms become severe, worsen, or with other concerning symptoms. Patient instructed to goimmediately to the emergency room and/or call 911 with any suicidal or homicidal ideations or if audio/visual hallucinations develop  Patient stated understanding and agrees. Patient given crisis center information. I spent a total of 60 minutes with the patient and over half of that time was spent on counselingand coordination of care regarding topics discussed above.     Provider Signature:  Electronically signed by JAZLYN Munoz Ma, CNP on 2/8/2021 at 6:58 PM

## 2021-07-20 RX ORDER — ARIPIPRAZOLE 10 MG/1
10 TABLET ORAL DAILY
Qty: 90 TABLET | Refills: 0 | Status: SHIPPED | OUTPATIENT
Start: 2021-07-20 | End: 2021-10-11

## 2021-07-20 NOTE — TELEPHONE ENCOUNTER
Lizabeth Campos is a former patient of Dr. Elizabeth Rosa who was last seen 2/8/21 and is scheduled to resume care with you 9/15/21. [facility] mail order pharmacy has requested a 90 day refill of Abilify 10mg/d. The last 90 day script was written 2/8/21 for a 90 day supply with one refill.     Medication has been loaded pending approval.

## 2021-08-23 ENCOUNTER — HOSPITAL ENCOUNTER (OUTPATIENT)
Dept: INFUSION THERAPY | Age: 76
Discharge: HOME | End: 2021-08-23
Payer: MEDICARE

## 2021-08-23 ENCOUNTER — OFFICE VISIT (OUTPATIENT)
Dept: ONCOLOGY | Age: 76
End: 2021-08-23
Payer: MEDICARE

## 2021-08-23 VITALS
RESPIRATION RATE: 18 BRPM | TEMPERATURE: 96.6 F | SYSTOLIC BLOOD PRESSURE: 167 MMHG | OXYGEN SATURATION: 98 % | BODY MASS INDEX: 26.57 KG/M2 | HEIGHT: 71 IN | WEIGHT: 189.8 LBS | DIASTOLIC BLOOD PRESSURE: 99 MMHG | HEART RATE: 71 BPM

## 2021-08-23 DIAGNOSIS — Z12.31 VISIT FOR SCREENING MAMMOGRAM: ICD-10-CM

## 2021-08-23 DIAGNOSIS — C50.911 PRIMARY MALIGNANT NEOPLASM OF RIGHT BREAST WITH STAGE 3 NODAL METASTASIS PER AMERICAN JOINT COMMITTEE ON CANCER 7TH EDITION (N3) (HCC): Primary | ICD-10-CM

## 2021-08-23 DIAGNOSIS — C50.911 PRIMARY MALIGNANT NEOPLASM OF RIGHT BREAST WITH STAGE 3 NODAL METASTASIS PER AMERICAN JOINT COMMITTEE ON CANCER 7TH EDITION (N3) (HCC): ICD-10-CM

## 2021-08-23 DIAGNOSIS — C77.9 PRIMARY MALIGNANT NEOPLASM OF RIGHT BREAST WITH STAGE 3 NODAL METASTASIS PER AMERICAN JOINT COMMITTEE ON CANCER 7TH EDITION (N3) (HCC): ICD-10-CM

## 2021-08-23 DIAGNOSIS — C77.9 PRIMARY MALIGNANT NEOPLASM OF RIGHT BREAST WITH STAGE 3 NODAL METASTASIS PER AMERICAN JOINT COMMITTEE ON CANCER 7TH EDITION (N3) (HCC): Primary | ICD-10-CM

## 2021-08-23 LAB
ABSOLUTE IMMATURE GRANULOCYTE: 0.01 THOU/MM3 (ref 0–0.07)
BASINOPHIL, AUTOMATED: 1 % (ref 0–3)
BASOPHILS ABSOLUTE: 0.1 THOU/MM3 (ref 0–0.1)
BUN, WHOLE BLOOD: 10 MG/DL (ref 8–26)
CHLORIDE, WHOLE BLOOD: 101 MEQ/L (ref 98–109)
CREATININE, WHOLE BLOOD: 0.6 MG/DL (ref 0.5–1.2)
EOSINOPHILS ABSOLUTE: 0.2 THOU/MM3 (ref 0–0.4)
EOSINOPHILS RELATIVE PERCENT: 3 % (ref 0–4)
GFR, ESTIMATED ,CON: > 90 ML/MIN/1.73M2
GLUCOSE, WHOLE BLOOD: 98 MG/DL (ref 70–108)
HCT VFR BLD CALC: 43.3 % (ref 37–47)
HEMOGLOBIN: 14.8 GM/DL (ref 12–16)
IMMATURE GRANULOCYTES: 0 %
IONIZED CALCIUM, WHOLE BLOOD: 1.15 MMOL/L (ref 1.12–1.32)
LYMPHOCYTES # BLD: 24 % (ref 15–47)
LYMPHOCYTES ABSOLUTE: 1.5 THOU/MM3 (ref 1–4.8)
MCH RBC QN AUTO: 32 PG (ref 26–33)
MCHC RBC AUTO-ENTMCNC: 34.2 GM/DL (ref 32.2–35.5)
MCV RBC AUTO: 94 FL (ref 81–99)
MONOCYTES ABSOLUTE: 0.5 THOU/MM3 (ref 0.4–1.3)
MONOCYTES: 9 % (ref 0–12)
PDW BLD-RTO: 13.2 % (ref 11.5–14.5)
PLATELET # BLD: 216 THOU/MM3 (ref 130–400)
PMV BLD AUTO: 10.8 FL (ref 9.4–12.4)
POTASSIUM, WHOLE BLOOD: 3.9 MEQ/L (ref 3.5–4.9)
RBC # BLD: 4.63 MILL/MM3 (ref 4.2–5.4)
SEG NEUTROPHILS: 63 % (ref 43–75)
SEGMENTED NEUTROPHILS ABSOLUTE COUNT: 4 THOU/MM3 (ref 1.8–7.7)
SODIUM, WHOLE BLOOD: 139 MEQ/L (ref 138–146)
TOTAL CO2, WHOLE BLOOD: 29 MEQ/L (ref 23–33)
WBC # BLD: 6.3 THOU/MM3 (ref 4.8–10.8)

## 2021-08-23 PROCEDURE — 80076 HEPATIC FUNCTION PANEL: CPT

## 2021-08-23 PROCEDURE — 85025 COMPLETE CBC W/AUTO DIFF WBC: CPT

## 2021-08-23 PROCEDURE — 36415 COLL VENOUS BLD VENIPUNCTURE: CPT

## 2021-08-23 PROCEDURE — 99211 OFF/OP EST MAY X REQ PHY/QHP: CPT

## 2021-08-23 PROCEDURE — 80047 BASIC METABLC PNL IONIZED CA: CPT

## 2021-08-23 PROCEDURE — 99213 OFFICE O/P EST LOW 20 MIN: CPT | Performed by: INTERNAL MEDICINE

## 2021-08-23 RX ORDER — ASPIRIN 81 MG/1
81 TABLET ORAL DAILY
COMMUNITY

## 2021-08-23 NOTE — PROGRESS NOTES
[facility] CENTER  CANCER NETWORK OF [facility]  [facility] [address] [facility]  [address], [facility] [address] [place] [address]  Dept: [phone]  Dept Fax: [phone]  Loc: [phone]    Subjective:      Chief Complaint:  Amie Ogden is a 77 y. o.  with breast cancer. The patient found a mass in her right breast in July 2014 and a mammogram confirmed an abnormality. A biopsy was completed on July 25, 2014 and surgical pathology from the right breast biopsy demonstrated a grade 2 infiltrating ductal carcinoma. The tumor was noted to be estrogen receptor positive, progesterone receptor positive, and HER-2/laura was not over amplified. The patient had a lumpectomy and right axillary node dissection completed in August 2014. The tumor measured 3.4 cm in greatest diameter. The sentinel lymph node was positive as were two additional lymph nodes. A total of twelve lymph nodes were resected. Oncotype DX testing performed revealed a recurrent score of 18. The patient opted to participate in clinical trial number  she was randomized to hormonal therapy only. The patient received breast radiation therapy in October and November 2014. She was then started on therapy with Femara, 2.5 mg. HPI:     The patient is here today for follow examination. She is here for follow up of her history of breast cancer. Her overall health has been good. She has no signs or symptoms that are suggestive of recurrence of her breast cancer. The patient denies skeletal pain. She has not had fever or other signs of infection. The patient denies shortness of breath, chest pain, a change in bowel habits or a change in bladder habits. ECOG performance status is level 0. Her most recent mammogram was on 08/21/2021. This was reported as a benign appearing mammogram.  The results of the mammogram reviewed with the patient today.     PMH, SH, and FH:  I reviewed the PMH, SH and FH as noted on the electronic medical record. There have been no changes as noted in the previous documentation. Review of Systems   Constitutional: Negative. HENT: Negative. Eyes: Negative. Respiratory: Negative. Cardiovascular: Negative. Gastrointestinal: Negative. Genitourinary: Negative. Musculoskeletal: Negative. Skin: Negative. Neurological: Negative. Hematological: Negative. Psychiatric/Behavioral: Negative. Objective:   Physical Exam   Vitals:    08/23/21 1313   BP: (!) 167/99   Pulse: 71   Resp: 18   Temp: 96.6 °F (35.9 °C)   SpO2: 98%   Vitals reviewed and are stable. Constitutional: Well-developed. No acute distress. HENT: Normocephalic and atraumatic. Eyes: Pupils appear equal and reactive. Neck: Overall appearance is symmetrical. No identifiable masses. Pulmonary: Effort normal. No respiratory distress. Chest:  Bilateral breast exam displays no palpable abnormalities. Surgical incisions are well-healed. There is no evidence of any localized recurrence. Neurological: Alert and oriented to person, place, and time. Judgment and thought content normal.  Skin: Skin is warm and dry. No rash. Psychiatric: Mood and affect appropriate for the clinical situation. Data Analysis: The following laboratory studies were reviewed with the patient today:    Hematology 8/23/2021 7/7/2020 3/11/2019   WBC 6.3 6.9 6.7   RBC 4.63 4.36 4.85   HGB 14.8 14.2 15.2   HCT 43.3 41.4 46.3   MCV 94 95 95   RDW 13.2 12.9 10.6 (L)    227 193     Assessment:   1. Breast cancer. Plan:   1. Monitor for recurrence of malignancy. 2.  Continue annual mammogram.    Hilary Ferreira M.D.                                                                          Medical Director: [facility]  [facility] Network [facility]  [address] [facility], [place] [address]

## 2021-08-24 LAB
ALBUMIN SERPL-MCNC: 4.5 G/DL (ref 3.5–5.1)
ALP BLD-CCNC: 91 U/L (ref 38–126)
ALT SERPL-CCNC: 14 U/L (ref 11–66)
AST SERPL-CCNC: 19 U/L (ref 5–40)
BILIRUB SERPL-MCNC: 0.4 MG/DL (ref 0.3–1.2)
BILIRUBIN DIRECT: < 0.2 MG/DL (ref 0–0.3)
TOTAL PROTEIN: 7.2 G/DL (ref 6.1–8)

## 2021-09-15 ENCOUNTER — OFFICE VISIT (OUTPATIENT)
Dept: PSYCHIATRY | Age: 76
End: 2021-09-15
Payer: MEDICARE

## 2021-09-15 DIAGNOSIS — F31.81 BIPOLAR II DISORDER IN FULL REMISSION (HCC): Primary | ICD-10-CM

## 2021-09-15 DIAGNOSIS — F41.1 GAD (GENERALIZED ANXIETY DISORDER): ICD-10-CM

## 2021-09-15 PROCEDURE — 99213 OFFICE O/P EST LOW 20 MIN: CPT | Performed by: NURSE PRACTITIONER

## 2021-09-15 NOTE — PROGRESS NOTES
Progress Note            9-    CC: Bipolar II D/O in full remission   AMANDA     HPI   Madelaine Hernandes is a transfer client from Dr Saul Scott here at [facility] and is seen for F/U and medication management. Currently Rxed Abilify and reports med is working [employer] Denies having side effects. Good med compliance is reported. Denies having  depression. Or mood swings. Denies feelings of harm towards self or others. Reports has anxiety off and on for years. But reports Abilify helps with this. Reports appetite is and sleep are \"ggod. \" Reports getting along well with . Denies need for counseling. Labs reviewed done 8- reflect no critical abnormals. Lipids reports were done and will get from PCP for this office      Past Medical Hx:     Past Medical History:   Diagnosis Date    Cancer ([facility] [address]     Breast    Hypertension     No Known Allergies    Past Psychiatric Hx: Reports one past psych hospitalization on [facility] in 2007. Denies any past suicidal gestures. Prio client of Dr Saul Scott here at [facility]      Family Hx:  Reports son has depression      Social Hx:  TOBACCO: Denies use of tobacco products   ETOH: Reports use of alcohol once yearly  DRUGS: Denies use of illicit substances   MARITAL STATUS:  x 50 years first marriage Reports gets along well. OCCUPATION: Retired from [employer]: Reports having high school diploma  LIVING SITUATION: Lives in [place], [place] with .  Had 3 children and 2 are living        MSE:  Level of consciousness: Alert  Appearance: in chair and fair grooming   Behavior/Motor: no abnormalities noted   Attitude toward examiner: cooperative   Speech: Normal volume, goal directed, NRR  Mood: Euthymic  Affect: Reactive  Thought processes: Linear and goal directed   Suicidal Ideation: Denies suicidal ideations  Homicidal ideation: Denies homicidal ideations  Delusions: No evidence of delusions is observed  Perceptual Disturbance: Denies AH/VH;  No evidence of psychosis is observed. Cognition: Oriented to person, place, time and situation   Concentration fair   Memory intact   Insight: Fair  Judgment: Fair     ROS:  Constitutional: Negative for appetite change, diaphoresis, fatigue and fever. HENT: Negative for congestion, sore throat and tinnitus. Eyes: Negative for visual disturbance. Respiratory: Negative for cough, shortness of breath and wheezing. Cardiovascular: Negative for chest pain and leg swelling. Gastrointestinal: Negative for nausea, vomiting, diarrhea. Negative for abdominal pain. Genitourinary: Negative for frequency. Musculoskeletal: Negative for arthralgias, myalgias and neck stiffness. Skin: Negative for puritis. Reports has breast CA in remission   Neurological: Negative for dizziness, weakness and headaches. All other systems reviewed and are negative. AIMS: No abnormal or involuntary movements observed or reported. Impression:  Bipolar II D/O in full remission   AMANDA     Plan:   Cont Abilify 10mg po q hs   Medication education given.  Perez Mendez  voiced understanding   Lipids reports were done and will get from PCP for this office   Will obtain EKG   RTC 3 mos

## 2021-10-11 RX ORDER — ARIPIPRAZOLE 10 MG/1
TABLET ORAL
Qty: 90 TABLET | Refills: 0 | Status: SHIPPED | OUTPATIENT
Start: 2021-10-11 | End: 2021-12-22 | Stop reason: SDUPTHER

## 2021-10-11 NOTE — TELEPHONE ENCOUNTER
[facility] (mail order) is requesting a medication refill on Arabella's behalf for Abilify 10mg;#90 with 0 refills; last with a start date of 07/20/21 and last refill date of 07/24/21. Medication is pending your approval for a 90 day supply with 0 refills; she is not scheduled to return until 12/22/21; last seen on 09/15/21 as a transfer patient.

## 2021-12-22 ENCOUNTER — OFFICE VISIT (OUTPATIENT)
Dept: PSYCHIATRY | Age: 76
End: 2021-12-22
Payer: MEDICARE

## 2021-12-22 DIAGNOSIS — F31.81 BIPOLAR II DISORDER IN FULL REMISSION (HCC): Primary | ICD-10-CM

## 2021-12-22 DIAGNOSIS — F41.1 GAD (GENERALIZED ANXIETY DISORDER): ICD-10-CM

## 2021-12-22 PROCEDURE — 99213 OFFICE O/P EST LOW 20 MIN: CPT | Performed by: NURSE PRACTITIONER

## 2021-12-22 RX ORDER — ARIPIPRAZOLE 10 MG/1
10 TABLET ORAL NIGHTLY
Qty: 90 TABLET | Refills: 0 | Status: SHIPPED | OUTPATIENT
Start: 2021-12-22 | End: 2022-01-29 | Stop reason: SDUPTHER

## 2021-12-22 NOTE — PROGRESS NOTES
Progress Note                                                                              12-     CC: Bipolar II D/O in full remission   AMANDA     Kira Storey is seen  for F/U and medication management. States her Abilify continues to work well. Denies having  side effects. Good med compliance is reported. Denies having  mood swings. Denies having anxiety. Reports feels a little down as jean pierre because thinks of her son that was killed 9 years ago. Denies feelings of harm towards self or others. Reports appetite and sleep remain \"good\" Reports getting along well with . Denies need for counseling. Labs reviewed done 8- reflect no critical abnormals. Lipids reports were done and reports again  will get from PCP for this office     Supportive counseling given. Allowed client to vent feelings about her son. We explored interventions to address her feelings of sadness and we discussed client's goals.      Past Medical Hx:          Past Medical History:   Diagnosis Date    Cancer ([facility] [address]       Breast    Hypertension      No Known Allergies     Past Psychiatric Hx: Reports one past psych hospitalization on [facility] in 2007. Denies any past suicidal gestures. Prio client of Dr Soraya Miller here at [facility]        Family Hx:  Reports son has depression      Social Hx:  TOBACCO: Denies use of tobacco products   ETOH: Reports use of alcohol once yearly  DRUGS: Denies use of illicit substances   MARITAL STATUS:  x 50 years first marriage Reports gets along well. OCCUPATION: Retired from [address] [place], [place] [email or web address] as [employer] [facility] [employer] EDUCATION: Reports having high school diploma  [employer] in [place], [place] with .  Had 3 children and 2 are living         MSE:  Level of consciousness: Alert  Appearance: in chair and fair grooming   Behavior/Motor: no abnormalities noted   Attitude toward examiner: cooperative   Speech: Normal volume, goal directed, NRR  Mood: Euthymic  Affect: Reactive  Thought

## 2022-01-28 NOTE — TELEPHONE ENCOUNTER
LEIA Vimal called into the office stating that they need a refill on Arabella's Abilify medication; per chart the last Rx was sent on 12/22/21 for a 90 day supply but to her local pharmacy. I reached out to [facility] and spoke with her ; he said that she uses the mail order for her Abilify medication. Medication has been reloaded pending your approval for Abilify 10mg;#90 with 0 refills. She is scheduled to return on 03/23/22.

## 2022-01-29 RX ORDER — ARIPIPRAZOLE 10 MG/1
10 TABLET ORAL NIGHTLY
Qty: 90 TABLET | Refills: 0 | Status: SHIPPED | OUTPATIENT
Start: 2022-01-29 | End: 2022-03-23 | Stop reason: SDUPTHER

## 2022-03-23 ENCOUNTER — OFFICE VISIT (OUTPATIENT)
Dept: PSYCHIATRY | Age: 77
End: 2022-03-23
Payer: MEDICARE

## 2022-03-23 DIAGNOSIS — F41.1 GAD (GENERALIZED ANXIETY DISORDER): ICD-10-CM

## 2022-03-23 DIAGNOSIS — F31.81 BIPOLAR II DISORDER IN FULL REMISSION (HCC): Primary | ICD-10-CM

## 2022-03-23 PROCEDURE — 99213 OFFICE O/P EST LOW 20 MIN: CPT | Performed by: NURSE PRACTITIONER

## 2022-03-23 RX ORDER — ARIPIPRAZOLE 10 MG/1
10 TABLET ORAL NIGHTLY
Qty: 90 TABLET | Refills: 0 | Status: SHIPPED | OUTPATIENT
Start: 2022-03-23 | End: 2022-06-22 | Stop reason: SDUPTHER

## 2022-03-23 NOTE — PROGRESS NOTES
Progress Note                                                                              6-     CC: Bipolar II D/O in full remission   AMANDA     Connor Lopes is seen  for F/U and medication management. Reports her  her Abilify continues to work well. Denies having  side effects. Good med compliance is reported. Denies having  mood swings. Denies having anxiety. Denies feelings of harm towards self or others. Reports appetite and sleep are still  \"good\" Reports still getting along well with . Denies need for counseling. Labs reviewed done 8- reflect no critical abnormals. Lipids reports were done and reports again  will get from PCP for this office  Reports dealing better with death of son 9 years ago.        Past Medical Hx:             Past Medical History:   Diagnosis Date    Cancer [facility])       Breast    Hypertension      No Known Allergies     Past Psychiatric Hx: Reports one past psych hospitalization on [facility] in 2007. Denies any past suicidal gestures. Prio client of Dr Otis Giordano here at [facility]        Family Hx:  Reports son has depression      Social Hx:  TOBACCO: Denies use of tobacco products   ETOH: Reports use of alcohol once yearly  DRUGS: Denies use of illicit substances   MARITAL STATUS:  x 50 years first marriage Reports gets along well.   OCCUPATION: Retired from [address] [place], [place] [address] as 17 [facility] EDUCATION: Reports having high school diploma  [employer] in [place], [place] with .  Had 3 children and 2 are living         MSE:  Level of consciousness: Alert  Appearance: in chair and fair grooming   Behavior/Motor: no abnormalities noted   Attitude toward examiner: cooperative   Speech: Normal volume, goal directed, NRR  Mood: Euthymic  Affect: Reactive  Thought processes: Linear and goal directed   Suicidal Ideation: Denies suicidal ideations  Homicidal ideation: Denies homicidal ideations  Delusions: No evidence of delusions is observed  Perceptual Disturbance: Denies AH/VH;  No evidence of psychosis is observed. Cognition: Oriented to person, place, time and situation   Concentration fair   Memory intact   Insight: Fair  Judgment: Fair     ROS:  Constitutional: Negative for appetite change, diaphoresis, fatigue and fever. HENT: Negative for congestion, sore throat and tinnitus.    Eyes: Negative for visual disturbance. Respiratory: Negative for cough, shortness of breath and wheezing.    Cardiovascular: Negative for chest pain and leg swelling. Gastrointestinal: Negative for nausea, vomiting, diarrhea. Negative for abdominal pain. Genitourinary: Negative for frequency. Musculoskeletal: Negative for arthralgias, myalgias and neck stiffness. Skin: Negative for puritis. Reports has breast CA in remission   Neurological: Negative for dizziness, weakness and headaches. All other systems reviewed and are negative.     AIMS: No abnormal or involuntary movements observed or reported.      Impression:  Bipolar II D/O in full remission   AMANDA     Plan:   Cont Abilify 10mg po q hs   Medication education given.  Yumiko Toussaint  voiced understanding   Lipids reports were done and states again  will get from PCP for this office   Will obtain EKG   RTC 3 mos

## 2022-06-22 ENCOUNTER — OFFICE VISIT (OUTPATIENT)
Dept: PSYCHIATRY | Age: 77
End: 2022-06-22
Payer: MEDICARE

## 2022-06-22 DIAGNOSIS — F41.1 GAD (GENERALIZED ANXIETY DISORDER): ICD-10-CM

## 2022-06-22 DIAGNOSIS — F31.81 BIPOLAR II DISORDER IN FULL REMISSION (HCC): Primary | ICD-10-CM

## 2022-06-22 PROCEDURE — 99214 OFFICE O/P EST MOD 30 MIN: CPT | Performed by: NURSE PRACTITIONER

## 2022-06-22 PROCEDURE — 1123F ACP DISCUSS/DSCN MKR DOCD: CPT | Performed by: NURSE PRACTITIONER

## 2022-06-22 RX ORDER — ARIPIPRAZOLE 10 MG/1
10 TABLET ORAL NIGHTLY
Qty: 90 TABLET | Refills: 0 | Status: SHIPPED | OUTPATIENT
Start: 2022-06-22 | End: 2022-10-05 | Stop reason: SDUPTHER

## 2022-06-22 NOTE — PROGRESS NOTES
Progress Note                                                                              6-      CC: Bipolar II D/O in full remission   AMANDA      HPI   Arabella is seen  for F/U and medication management. States her Abilify is still working \"really good\"  . Denies having  side effects. Good med compliance is reported. Denies having  mood swings. Denies having anxiety.  Denies feelings of harm towards self or others. Reports appetite and sleep remain  \"good\" Reports still getting along well with . Denies need for counseling. States she got a new car and is excited about this. Labs reviewed done 8- reflect no critical abnormals. Reports had Lipids done 2 weeks ago attempting to obtain these        Past Medical Hx:             Past Medical History:   Diagnosis Date    Cancer ([facility] [address]       Breast    Hypertension      No Known Allergies     Past Psychiatric Hx: Reports one past psych hospitalization on [facility] in 2007. Denies any past suicidal gestures. Prio client of Dr Rhonda Aden here at [facility]        Family Hx:  Reports son has depression      Social Hx:  TOBACCO: Denies use of tobacco products   ETOH: Reports use of alcohol once yearly  DRUGS: Denies use of illicit substances   MARITAL STATUS:  x 50 years first marriage Reports gets along well.   OCCUPATION: Retired from [address] [place] [address] [employer] as 17 [facility] EDUCATION: Reports having high school diploma  [employer] in [place], [place] with .  Had 3 children and 2 are living         MSE:  Level of consciousness: Alert  Appearance: in chair and fair grooming   Behavior/Motor: no abnormalities noted   Attitude toward examiner: cooperative   Speech: Normal volume, goal directed, NRR  Mood: Euthymic  Affect: Reactive  Thought processes: Linear and goal directed   Suicidal Ideation: Denies suicidal ideations  Homicidal ideation: Denies homicidal ideations  Delusions: No evidence of delusions is observed  Perceptual Disturbance: Denies AH/VH;  No evidence of psychosis is observed. Cognition: Oriented to person, place, time and situation   Concentration fair   Memory intact   Insight: Fair  Judgment: Fair     ROS:  Constitutional: Negative for appetite change, diaphoresis, fatigue and fever. HENT: Negative for congestion, sore throat and tinnitus.    Eyes: Negative for visual disturbance. Respiratory: Negative for cough, shortness of breath and wheezing.    Cardiovascular: Negative for chest pain and leg swelling. Gastrointestinal: Negative for nausea, vomiting, diarrhea. Negative for abdominal pain. Genitourinary: Negative for frequency. Musculoskeletal: Negative for arthralgias, myalgias and neck stiffness. Skin: Negative for puritis. Reports has breast CA in remission   Neurological: Negative for dizziness, weakness and headaches. All other systems reviewed and are negative.     AIMS: No abnormal or involuntary movements observed or reported.      Impression:  Bipolar II D/O in full remission   AMANAD     Plan:   Cont Abilify 10mg po q hs   Medication education given.  Flavia Tilley  voiced understanding   Lipids reports were done  2 weelks ago and states again  will get from PCP for this office   Will obtain EKG   RTC 3 mos ; sooner if needed

## 2022-08-25 NOTE — PROGRESS NOTES
[facility]  [facility]  [facility] [address] [facility] [address]  [address], [facility] [address]  [address]  Dept: [phone]  Dept Fax: [phone]  Loc: [phone]    Subjective:      Chief Complaint:  Arabella Hawkins is a 77 y.o.  with breast cancer. The patient found a mass in her right breast in July 2014 and a mammogram confirmed an abnormality. A biopsy was completed on July 25, 2014 and surgical pathology from the right breast biopsy demonstrated a grade 2 infiltrating ductal carcinoma. The tumor was noted to be estrogen receptor positive, progesterone receptor positive, and HER-2/laura was not over amplified. The patient had a lumpectomy and right axillary node dissection completed in August 2014. The tumor measured 3.4 cm in greatest diameter. The sentinel lymph node was positive as were two additional lymph nodes. A total of twelve lymph nodes were resected. Oncotype DX testing performed revealed a recurrent score of 18. The patient opted to participate in clinical trial number  she was randomized to hormonal therapy only. The patient received breast radiation therapy in October and November 2014. She was then started on therapy with Femara, 2.5 mg. HPI:     The patient is here today for follow examination. She is here for follow up of her history of breast cancer. Her overall health has been good. She has no signs or symptoms that are suggestive of recurrence of her breast cancer. The patient denies skeletal pain. She has not had fever or other signs of infection. The patient denies shortness of breath, chest pain, a change in bowel habits or a change in bladder habits. ECOG performance status is level 0. Her most recent mammogram was in 08/23/2022. This was reported as a benign appearing mammogram.  The results of the mammogram reviewed with the patient today.  The patient's blood pressure is modestly elevated. She will continue to monitor her blood pressure and follow-up with her primary care provider for further management. PMH, SH, and FH:  I reviewed the PMH, SH and FH as noted on the electronic medical record. There have been no changes as noted in the previous documentation. Review of Systems:  Constitutional: Negative. HENT: Negative. Eyes: Negative. Respiratory: Negative. Cardiovascular: Negative. Gastrointestinal: Negative. Genitourinary: Negative. Musculoskeletal: Negative. Skin: Negative. Neurological: Negative. Hematological: Negative. Psychiatric/Behavioral: Negative. Objective:   Physical Exam   Vitals:    08/29/22 1406   BP: (!) 147/94   Pulse: 67   Resp: 16   Temp: 98 °F (36.7 °C)   SpO2: 97%   Vitals reviewed and are stable. Constitutional: Well-developed. No acute distress. HENT: Normocephalic and atraumatic. Eyes: Pupils appear equal and reactive. Neck: Overall appearance is symmetrical. No identifiable masses. Pulmonary: Effort normal. No respiratory distress. Chest:  Bilateral breast exam displays no palpable abnormalities. Surgical incisions are well-healed. There is no evidence of any localized recurrence. Neurological: Alert and oriented to person, place, and time. Judgment and thought content normal.  Skin: Skin is warm and dry. No rash. Psychiatric: Mood and affect appropriate for the clinical situation. Data Analysis: The following laboratory studies were reviewed with the patient today:    Hematology 8/29/2022 8/23/2021 7/7/2020   WBC 5.8 6.3 6.9   RBC 4.66 4.63 4.36   HGB 14.7 14.8 14.2   HCT 43.9 43.3 41.4   MCV 94 94 95   RDW 13.0 13.2 12.9    216 227     Assessment:   1. Breast cancer. 2.  Hypertension. Plan:   1. Monitor for recurrence of malignancy. 2.  Continue annual mammogram.  3.  Monitor blood pressure and follow up with primary care provider for further surveillance and management. Dory Sosa M.D. Medical Director: [facility]  [facility] [place] [address], 1 [facility], [address] [place] [address], [address] [place] [address], [address] of the [facility] at the [facility]      **This report has been created using voice recognition software. It may contain minor errors which are inherent in voice recognition technology. **

## 2022-08-29 ENCOUNTER — HOSPITAL ENCOUNTER (OUTPATIENT)
Dept: INFUSION THERAPY | Age: 77
Discharge: HOME | End: 2022-08-29
Payer: MEDICARE

## 2022-08-29 ENCOUNTER — OFFICE VISIT (OUTPATIENT)
Dept: ONCOLOGY | Age: 77
End: 2022-08-29
Payer: MEDICARE

## 2022-08-29 VITALS
BODY MASS INDEX: 26.05 KG/M2 | DIASTOLIC BLOOD PRESSURE: 94 MMHG | WEIGHT: 182 LBS | RESPIRATION RATE: 16 BRPM | TEMPERATURE: 98 F | OXYGEN SATURATION: 97 % | SYSTOLIC BLOOD PRESSURE: 147 MMHG | HEIGHT: 70 IN | HEART RATE: 67 BPM

## 2022-08-29 VITALS
SYSTOLIC BLOOD PRESSURE: 147 MMHG | HEART RATE: 67 BPM | TEMPERATURE: 98 F | OXYGEN SATURATION: 97 % | DIASTOLIC BLOOD PRESSURE: 94 MMHG | RESPIRATION RATE: 16 BRPM

## 2022-08-29 DIAGNOSIS — C50.911 PRIMARY MALIGNANT NEOPLASM OF RIGHT BREAST WITH STAGE 3 NODAL METASTASIS PER AMERICAN JOINT COMMITTEE ON CANCER 7TH EDITION (N3) (HCC): Primary | ICD-10-CM

## 2022-08-29 DIAGNOSIS — Z12.31 VISIT FOR SCREENING MAMMOGRAM: ICD-10-CM

## 2022-08-29 DIAGNOSIS — I10 HYPERTENSION, UNSPECIFIED TYPE: ICD-10-CM

## 2022-08-29 DIAGNOSIS — C77.9 PRIMARY MALIGNANT NEOPLASM OF RIGHT BREAST WITH STAGE 3 NODAL METASTASIS PER AMERICAN JOINT COMMITTEE ON CANCER 7TH EDITION (N3) (HCC): ICD-10-CM

## 2022-08-29 DIAGNOSIS — C50.911 PRIMARY MALIGNANT NEOPLASM OF RIGHT BREAST WITH STAGE 3 NODAL METASTASIS PER AMERICAN JOINT COMMITTEE ON CANCER 7TH EDITION (N3) (HCC): ICD-10-CM

## 2022-08-29 DIAGNOSIS — C77.9 PRIMARY MALIGNANT NEOPLASM OF RIGHT BREAST WITH STAGE 3 NODAL METASTASIS PER AMERICAN JOINT COMMITTEE ON CANCER 7TH EDITION (N3) (HCC): Primary | ICD-10-CM

## 2022-08-29 LAB
ABSOLUTE IMMATURE GRANULOCYTE: 0.01 THOU/MM3 (ref 0–0.07)
ALBUMIN SERPL-MCNC: 4.6 G/DL (ref 3.5–5.1)
ALP BLD-CCNC: 81 U/L (ref 38–126)
ALT SERPL-CCNC: 12 U/L (ref 11–66)
AST SERPL-CCNC: 20 U/L (ref 5–40)
BASINOPHIL, AUTOMATED: 1 % (ref 0–3)
BASOPHILS ABSOLUTE: 0.1 THOU/MM3 (ref 0–0.1)
BILIRUB SERPL-MCNC: 0.5 MG/DL (ref 0.3–1.2)
BILIRUBIN DIRECT: < 0.2 MG/DL (ref 0–0.3)
BUN, WHOLE BLOOD: 8 MG/DL (ref 8–26)
CHLORIDE, WHOLE BLOOD: 100 MEQ/L (ref 98–109)
CREATININE, WHOLE BLOOD: 0.5 MG/DL (ref 0.5–1.2)
EOSINOPHILS ABSOLUTE: 0.2 THOU/MM3 (ref 0–0.4)
EOSINOPHILS RELATIVE PERCENT: 3 % (ref 0–4)
GFR, ESTIMATED ,CON: > 90 ML/MIN/1.73M2
GLUCOSE, WHOLE BLOOD: 84 MG/DL (ref 70–108)
HCT VFR BLD CALC: 43.9 % (ref 37–47)
HEMOGLOBIN: 14.7 GM/DL (ref 12–16)
IMMATURE GRANULOCYTES: 0 %
IONIZED CALCIUM, WHOLE BLOOD: 1.17 MMOL/L (ref 1.12–1.32)
LYMPHOCYTES # BLD: 26 % (ref 15–47)
LYMPHOCYTES ABSOLUTE: 1.5 THOU/MM3 (ref 1–4.8)
MCH RBC QN AUTO: 31.5 PG (ref 26–33)
MCHC RBC AUTO-ENTMCNC: 33.5 GM/DL (ref 32.2–35.5)
MCV RBC AUTO: 94 FL (ref 81–99)
MONOCYTES ABSOLUTE: 0.5 THOU/MM3 (ref 0.4–1.3)
MONOCYTES: 8 % (ref 0–12)
PDW BLD-RTO: 13 % (ref 11.5–14.5)
PLATELET # BLD: 218 THOU/MM3 (ref 130–400)
PMV BLD AUTO: 11 FL (ref 9.4–12.4)
POTASSIUM, WHOLE BLOOD: 4 MEQ/L (ref 3.5–4.9)
RBC # BLD: 4.66 MILL/MM3 (ref 4.2–5.4)
SEG NEUTROPHILS: 62 % (ref 43–75)
SEGMENTED NEUTROPHILS ABSOLUTE COUNT: 3.6 THOU/MM3 (ref 1.8–7.7)
SODIUM, WHOLE BLOOD: 139 MEQ/L (ref 138–146)
TOTAL CO2, WHOLE BLOOD: 29 MEQ/L (ref 23–33)
TOTAL PROTEIN: 7 G/DL (ref 6.1–8)
WBC # BLD: 5.8 THOU/MM3 (ref 4.8–10.8)

## 2022-08-29 PROCEDURE — 99211 OFF/OP EST MAY X REQ PHY/QHP: CPT

## 2022-08-29 PROCEDURE — 99213 OFFICE O/P EST LOW 20 MIN: CPT | Performed by: INTERNAL MEDICINE

## 2022-08-29 PROCEDURE — 1123F ACP DISCUSS/DSCN MKR DOCD: CPT | Performed by: INTERNAL MEDICINE

## 2022-08-29 PROCEDURE — 80076 HEPATIC FUNCTION PANEL: CPT

## 2022-08-29 PROCEDURE — 85025 COMPLETE CBC W/AUTO DIFF WBC: CPT

## 2022-08-29 PROCEDURE — 80047 BASIC METABLC PNL IONIZED CA: CPT

## 2022-08-29 RX ORDER — BRIMONIDINE TARTRATE, TIMOLOL MALEATE 2; 5 MG/ML; MG/ML
1 SOLUTION/ DROPS OPHTHALMIC EVERY 12 HOURS
COMMUNITY

## 2022-10-05 ENCOUNTER — OFFICE VISIT (OUTPATIENT)
Dept: PSYCHIATRY | Age: 77
End: 2022-10-05
Payer: MEDICARE

## 2022-10-05 DIAGNOSIS — F41.1 GAD (GENERALIZED ANXIETY DISORDER): ICD-10-CM

## 2022-10-05 DIAGNOSIS — F31.81 BIPOLAR II DISORDER IN FULL REMISSION (HCC): Primary | ICD-10-CM

## 2022-10-05 PROCEDURE — 1123F ACP DISCUSS/DSCN MKR DOCD: CPT | Performed by: NURSE PRACTITIONER

## 2022-10-05 PROCEDURE — 99214 OFFICE O/P EST MOD 30 MIN: CPT | Performed by: NURSE PRACTITIONER

## 2022-10-05 RX ORDER — ARIPIPRAZOLE 10 MG/1
10 TABLET ORAL NIGHTLY
Qty: 90 TABLET | Refills: 0 | Status: SHIPPED | OUTPATIENT
Start: 2022-10-05

## 2022-10-05 NOTE — PROGRESS NOTES
Progress Note                                                                              10-4-2022       CC: Bipolar II D/O in full remission   AMANDA      HPI   Shahla Espinal is seen  for F/U and medication management. Reports her Abilify is still working well. Denies having  side effects. Good med compliance is reported. Denies having  mood swings. Denies having anxiety. Denies feelings of harm towards self or others. Reports still eating and sleeping well. Reports still getting along well with . Denies need for counseling. CBC and Hepatic GFR reviewed done 3-98-89hccontm no critical abnormals. Will order Lipids today         Past Medical Hx:             Past Medical History:   Diagnosis Date    Cancer ([facility] [address]       Breast    Hypertension      No Known Allergies     Past Psychiatric Hx: Reports one past psych hospitalization on [facility] in 2007. Denies any past suicidal gestures. Prio client of Dr Melba Dukes here at [facility]        Family Hx:  Reports son has depression      Social Hx:  TOBACCO: Denies use of tobacco products   ETOH: Reports use of alcohol once yearly  DRUGS: Denies use of illicit substances   MARITAL STATUS:  x 50 years first marriage Reports gets along well. OCCUPATION: Retired from [employer]: Reports having high school diploma  LIVING SITUATION: Lives in [place], [place] with . Had 3 children and 2 are living         MSE:  Level of consciousness: Alert  Appearance: in chair and fair grooming   Behavior/Motor: no abnormalities noted   Attitude toward examiner: cooperative   Speech: Normal volume, goal directed, NRR  Mood: Euthymic  Affect: Reactive  Thought processes: Linear and goal directed   Suicidal Ideation: Denies suicidal ideations  Homicidal ideation: Denies homicidal ideations  Delusions: No evidence of delusions is observed  Perceptual Disturbance: Denies AH/VH;  No evidence of psychosis is observed.   Cognition: Oriented to person, place, time and situation   Concentration fair   Memory intact   Insight: Fair  Judgment: Fair     ROS:  Constitutional: Negative for appetite change, diaphoresis, fatigue and fever. HENT: Negative for congestion, sore throat and tinnitus. Eyes: Negative for visual disturbance. Respiratory: Negative for cough, shortness of breath and wheezing. Cardiovascular: Negative for chest pain and leg swelling. Gastrointestinal: Negative for nausea, vomiting, diarrhea. Negative for abdominal pain. Genitourinary: Negative for frequency. Musculoskeletal: Negative for arthralgias, myalgias and neck stiffness. Skin: Negative for puritis. Reports has breast CA in remission   Neurological: Negative for dizziness, weakness and headaches. All other systems reviewed and are negative. AIMS: No abnormal or involuntary movements observed or reported. Impression:  Bipolar II D/O in full remission   AMANDA     Plan:   Cont Abilify 10mg po q hs   Medication education given. [facility]  voiced understanding   Lipids BMP ordred today to be done asap   [facility] advised to call 911 and or go to nearest ED if symptoms worsen or has feelings of harm towards self or others. Arabella voiced understanding and agreement with safety plan.    RTC 3 mos ; sooner if needed

## 2022-10-06 ENCOUNTER — NURSE ONLY (OUTPATIENT)
Dept: LAB | Age: 77
End: 2022-10-06

## 2022-10-06 DIAGNOSIS — F31.81 BIPOLAR II DISORDER IN FULL REMISSION (HCC): ICD-10-CM

## 2022-10-06 LAB
ANION GAP SERPL CALCULATED.3IONS-SCNC: 11 MEQ/L (ref 8–16)
BUN BLDV-MCNC: 8 MG/DL (ref 7–22)
CALCIUM SERPL-MCNC: 9.6 MG/DL (ref 8.5–10.5)
CHLORIDE BLD-SCNC: 97 MEQ/L (ref 98–111)
CO2: 28 MEQ/L (ref 23–33)
CREAT SERPL-MCNC: 0.6 MG/DL (ref 0.4–1.2)
GFR SERPL CREATININE-BSD FRML MDRD: > 90 ML/MIN/1.73M2
GLUCOSE BLD-MCNC: 95 MG/DL (ref 70–108)
POTASSIUM SERPL-SCNC: 4.2 MEQ/L (ref 3.5–5.2)
SODIUM BLD-SCNC: 136 MEQ/L (ref 135–145)

## 2023-01-04 ENCOUNTER — OFFICE VISIT (OUTPATIENT)
Dept: PSYCHIATRY | Age: 78
End: 2023-01-04
Payer: MEDICARE

## 2023-01-04 DIAGNOSIS — F31.81 BIPOLAR II DISORDER IN FULL REMISSION (HCC): Primary | ICD-10-CM

## 2023-01-04 DIAGNOSIS — F41.1 GAD (GENERALIZED ANXIETY DISORDER): ICD-10-CM

## 2023-01-04 PROCEDURE — 99214 OFFICE O/P EST MOD 30 MIN: CPT | Performed by: NURSE PRACTITIONER

## 2023-01-04 PROCEDURE — 1123F ACP DISCUSS/DSCN MKR DOCD: CPT | Performed by: NURSE PRACTITIONER

## 2023-01-04 RX ORDER — ARIPIPRAZOLE 10 MG/1
10 TABLET ORAL NIGHTLY
Qty: 90 TABLET | Refills: 0 | Status: SHIPPED | OUTPATIENT
Start: 2023-01-04

## 2023-01-04 NOTE — PROGRESS NOTES
Progress Note                                                                              1-4-2023      CC: Bipolar II D/O in full remission   AMANDA      HPI   Criselda Eagle is seen  for F/U and medication management. States her Abilify is still working well. for her. Denies having  side effects. Good med compliance is reported. Denies having  mood swings. Denies having anxiety. Denies feelings of harm towards self or others. Reports still eating and sleeping well. Reports still getting along well with . Denies need for counseling. Reports her son still lives with them. And reports her daughter visits regularly. Reports jean pierre went well. CBC and Hepatic GFR reviewed done 8-29-22 BMP also reviewed done 10-5-22 reflect no critical abnormals. Lipids not done yet. But will get done. Past Medical Hx:             Past Medical History:   Diagnosis Date    Cancer ([facility] [address]       Breast    Hypertension      No Known Allergies     Past Psychiatric Hx: Reports one past psych hospitalization on [facility] in 2007. Denies any past suicidal gestures. [facility]        Family Hx:  Reports son has depression      Social Hx:  TOBACCO: Denies use of tobacco products   ETOH: Reports use of alcohol once yearly  DRUGS: Denies use of illicit substances   MARITAL STATUS:  x 50 years first marriage Reports gets along well. OCCUPATION: Retired from [employer]: Reports having high school diploma  LIVING SITUATION: Lives in [place], [place] with .  Had 3 children and 2 are living         MSE:  Level of consciousness: Alert  Appearance: in chair and fair grooming   Behavior/Motor: no abnormalities noted   Attitude toward examiner: cooperative   Speech: Normal volume, goal directed, NRR  Mood: Euthymic  Affect: Reactive  Thought processes: Linear and goal directed   Suicidal Ideation: Denies suicidal ideations  Homicidal ideation: Denies homicidal ideations  Delusions: No evidence of delusions is observed  Perceptual Disturbance: Denies AH/VH;  No evidence of psychosis is observed. Cognition: Oriented to person, place, time and situation   Concentration fair   Memory intact   Insight: Fair  Judgment: Fair     ROS:  Constitutional: Negative for appetite change, diaphoresis, fatigue and fever. HENT: Negative for congestion, sore throat and tinnitus. Eyes: Negative for visual disturbance. Respiratory: Negative for cough, shortness of breath and wheezing. Cardiovascular: Negative for chest pain and leg swelling. Gastrointestinal: Negative for nausea, vomiting, diarrhea. Negative for abdominal pain. Genitourinary: Negative for frequency. Musculoskeletal: Negative for arthralgias, myalgias and neck stiffness. Skin: Negative for puritis. Reports has breast CA in remission   Neurological: Negative for dizziness, weakness and headaches. All other systems reviewed and are negative. AIMS: No abnormal or involuntary movements observed or reported. Impression:  Bipolar II D/O in full remission   AMANDA     Plan:   Cont Abilify 10mg po q hs   Medication education given. [employer]  voiced understanding   Reports will get Lipids done. [employer] advised to call 911 and or go to nearest ED if symptoms worsen or has feelings of harm towards self or others. Arabella voiced understanding and agreement with safety plan.    RTC 3 mos ; sooner if needed

## 2023-01-30 ENCOUNTER — NURSE ONLY (OUTPATIENT)
Dept: LAB | Age: 78
End: 2023-01-30

## 2023-01-30 LAB
CHOLEST SERPL-MCNC: 137 MG/DL (ref 100–199)
HDLC SERPL-MCNC: 72 MG/DL
LDLC SERPL CALC-MCNC: 51 MG/DL
TRIGL SERPL-MCNC: 71 MG/DL (ref 0–199)

## 2023-03-15 ENCOUNTER — TELEMEDICINE (OUTPATIENT)
Dept: PSYCHIATRY | Age: 78
End: 2023-03-15

## 2023-03-15 DIAGNOSIS — F31.81 BIPOLAR II DISORDER IN FULL REMISSION (HCC): Primary | ICD-10-CM

## 2023-03-15 DIAGNOSIS — F41.1 GAD (GENERALIZED ANXIETY DISORDER): ICD-10-CM

## 2023-03-15 RX ORDER — ARIPIPRAZOLE 10 MG/1
10 TABLET ORAL NIGHTLY
Qty: 90 TABLET | Refills: 0 | Status: SHIPPED | OUTPATIENT
Start: 2023-03-15

## 2023-03-15 NOTE — PROGRESS NOTES
Deborah Sonu, was evaluated through a synchronous (real-time) audio-video encounter. The patient (or guardian if applicable) is aware that this is a billable service, which includes applicable co-pays. This Virtual Visit was conducted with patient's (and/or legal guardian's) consent. The visit was conducted pursuant to the emergency declaration under the [facility], [address] [place] [address] authority and the [employer] General Act. Patient identification was verified, and a caregiver was present when appropriate. The patient was located at [facility]  Provider was located at [facility]         Total time spent for this encounter: JENNIFER    --JAZLYN Wei CNP on 3/15/2023 at 5:41 PM    An electronic signature was used to authenticate this note. Progress Note                                                                              3-     CC: Bipolar II D/O in full remission   AMANDA      HPI   hospitals is seen  for F/U and medication management. Reports her Abilify is still working [employer]" for her. Denies having  side effects. Good med compliance is reported. Denies having  mood swings. Denies having anxiety. Denies feelings of harm towards self or others. Reports still eating and sleeping well. Reports still getting along well with . Still denies need for counseling. Reports her son still lives with them. And reports her daughter still  visits regularly. Reports being in hospital for Diverticulitis and was released this month. and is feeling much netter. CBC and Hepatic GFR reviewed done 8-29-22 BMP also reviewed done 10-5-22 reflect no critical abnormals. Lipids reviewed 1-30-23 reflect no critical abnormals.          Past Medical Hx:             Past Medical History:   Diagnosis Date    Cancer ([facility] [address]       Breast    Hypertension      No Known Allergies     Past Psychiatric Hx: Reports one past psych hospitalization on [facility] in 2007. Denies any past suicidal gestures. [facility]        Family Hx:  Reports son has depression      Social Hx:  TOBACCO: Denies use of tobacco products   ETOH: Reports use of alcohol once yearly  DRUGS: Denies use of illicit substances   MARITAL STATUS:  over 48 years years first marriage Reports still gets along well. OCCUPATION: Retired from [employer]: Reports having high school diploma  LIVING SITUATION: Lives in [place], [place] with . Had 3 children and 2 are living         MSE:  Level of consciousness: Alert  Appearance: in chair and fair grooming   Behavior/Motor: no abnormalities noted   Attitude toward examiner: cooperative   Speech: Normal volume, goal directed, NRR  Mood: Euthymic  Affect: Reactive  Thought processes: Linear and goal directed   Suicidal Ideation: Denies suicidal ideations  Homicidal ideation: Denies homicidal ideations  Delusions: No evidence of delusions is observed  Perceptual Disturbance: Denies AH/VH;  No evidence of psychosis is observed. Cognition: Oriented to person, place, time and situation   Concentration fair   Memory intact   Insight: Fair  Judgment: Fair     ROS:  Constitutional: Negative for appetite change, diaphoresis, fatigue and fever. HENT: Negative for congestion, sore throat and tinnitus. Eyes: Negative for visual disturbance. Respiratory: Negative for cough, shortness of breath and wheezing. Cardiovascular: Negative for chest pain and leg swelling. Gastrointestinal: Negative for nausea, vomiting, diarrhea. Negative for abdominal pain. Genitourinary: Negative for frequency. Musculoskeletal: Negative for arthralgias, myalgias and neck stiffness. Skin: Negative for puritis. Reports has breast CA in remission   Neurological: Negative for dizziness, weakness and headaches. All other systems reviewed and are negative.      AIMS: No abnormal or involuntary movements observed or reported. Impression:  Bipolar II D/O in full remission   AMANDA     Plan:   Cont Abilify 10mg po q hs   Medication education given. Shahla Espinal  voiced understanding   Shahla Espinal advised to call 911 and or go to nearest ED if symptoms worsen or has feelings of harm towards self or others. Arabella voiced understanding and agreement with safety plan.    RTC 3 mos ; sooner if needed

## 2023-09-13 ENCOUNTER — TELEMEDICINE (OUTPATIENT)
Dept: PSYCHIATRY | Age: 78
End: 2023-09-13
Payer: MEDICARE

## 2023-09-13 DIAGNOSIS — F31.81 BIPOLAR II DISORDER IN FULL REMISSION (HCC): Primary | ICD-10-CM

## 2023-09-13 DIAGNOSIS — F41.1 GAD (GENERALIZED ANXIETY DISORDER): ICD-10-CM

## 2023-09-13 DIAGNOSIS — F43.21 GRIEF: ICD-10-CM

## 2023-09-13 PROCEDURE — 99214 OFFICE O/P EST MOD 30 MIN: CPT | Performed by: NURSE PRACTITIONER

## 2023-09-13 RX ORDER — ARIPIPRAZOLE 10 MG/1
10 TABLET ORAL NIGHTLY
Qty: 90 TABLET | Refills: 0 | Status: SHIPPED | OUTPATIENT
Start: 2023-09-13

## 2023-09-13 NOTE — PROGRESS NOTES
diarrhea. Negative for abdominal pain. Genitourinary: Negative for frequency. Musculoskeletal: Negative for arthralgias, myalgias and neck stiffness. Skin: Negative for puritis. Reports has breast CA in remission   Neurological: Negative for dizziness, weakness and headaches. All other systems reviewed and are negative. AIMS: No abnormal or involuntary movements observed or reported. Impression:  Bipolar II D/O in full remission   AMANDA  Grief      Plan:   Cont Abilify 10mg po q hs   Medication education given. Chetna Weber  voiced understanding   Denies need for counseling   Order CBC with Diff BMP, Hepatic draw asap   Chetna Weber advised to call 911 and or go to nearest ED if symptoms worsen or has feelings of harm towards self or others. Arabella voiced understanding and agreement with safety plan.    RTC 3 mos  sooner if needed

## 2023-09-19 DIAGNOSIS — C50.411 MALIGNANT NEOPLASM OF UPPER-OUTER QUADRANT OF RIGHT FEMALE BREAST, UNSPECIFIED ESTROGEN RECEPTOR STATUS (HCC): ICD-10-CM

## 2023-09-19 DIAGNOSIS — C77.9 PRIMARY MALIGNANT NEOPLASM OF RIGHT BREAST WITH STAGE 3 NODAL METASTASIS PER AMERICAN JOINT COMMITTEE ON CANCER 7TH EDITION (N3) (HCC): Primary | ICD-10-CM

## 2023-09-19 DIAGNOSIS — C50.911 PRIMARY MALIGNANT NEOPLASM OF RIGHT BREAST WITH STAGE 3 NODAL METASTASIS PER AMERICAN JOINT COMMITTEE ON CANCER 7TH EDITION (N3) (HCC): Primary | ICD-10-CM

## 2023-09-20 ENCOUNTER — HOSPITAL ENCOUNTER (OUTPATIENT)
Dept: INFUSION THERAPY | Age: 78
Discharge: HOME | End: 2023-09-20
Payer: MEDICARE

## 2023-09-20 ENCOUNTER — OFFICE VISIT (OUTPATIENT)
Dept: ONCOLOGY | Age: 78
End: 2023-09-20
Payer: MEDICARE

## 2023-09-20 VITALS
RESPIRATION RATE: 20 BRPM | HEIGHT: 70 IN | BODY MASS INDEX: 24.57 KG/M2 | WEIGHT: 171.6 LBS | HEART RATE: 72 BPM | TEMPERATURE: 98 F | OXYGEN SATURATION: 98 % | DIASTOLIC BLOOD PRESSURE: 83 MMHG | SYSTOLIC BLOOD PRESSURE: 141 MMHG

## 2023-09-20 VITALS
SYSTOLIC BLOOD PRESSURE: 141 MMHG | RESPIRATION RATE: 20 BRPM | BODY MASS INDEX: 24.57 KG/M2 | HEIGHT: 70 IN | HEART RATE: 72 BPM | DIASTOLIC BLOOD PRESSURE: 83 MMHG | OXYGEN SATURATION: 98 % | TEMPERATURE: 98 F | WEIGHT: 171.6 LBS

## 2023-09-20 DIAGNOSIS — C77.9 PRIMARY MALIGNANT NEOPLASM OF RIGHT BREAST WITH STAGE 3 NODAL METASTASIS PER AMERICAN JOINT COMMITTEE ON CANCER 7TH EDITION (N3) (HCC): Primary | ICD-10-CM

## 2023-09-20 DIAGNOSIS — Z12.31 ENCOUNTER FOR SCREENING MAMMOGRAM FOR MALIGNANT NEOPLASM OF BREAST: ICD-10-CM

## 2023-09-20 DIAGNOSIS — C50.411 MALIGNANT NEOPLASM OF UPPER-OUTER QUADRANT OF RIGHT FEMALE BREAST, UNSPECIFIED ESTROGEN RECEPTOR STATUS (HCC): ICD-10-CM

## 2023-09-20 DIAGNOSIS — C50.911 PRIMARY MALIGNANT NEOPLASM OF RIGHT BREAST WITH STAGE 3 NODAL METASTASIS PER AMERICAN JOINT COMMITTEE ON CANCER 7TH EDITION (N3) (HCC): Primary | ICD-10-CM

## 2023-09-20 LAB
ALBUMIN SERPL BCG-MCNC: 4.2 G/DL (ref 3.5–5.1)
ALP SERPL-CCNC: 89 U/L (ref 38–126)
ALT SERPL W/O P-5'-P-CCNC: 14 U/L (ref 11–66)
AST SERPL-CCNC: 18 U/L (ref 5–40)
BASOPHILS # BLD AUTO: 0.1 THOU/MM3 (ref 0–0.1)
BASOPHILS NFR BLD AUTO: 1 % (ref 0–3)
BILIRUB CONJ SERPL-MCNC: < 0.2 MG/DL (ref 0–0.3)
BILIRUB SERPL-MCNC: 0.4 MG/DL (ref 0.3–1.2)
BUN BLDP-MCNC: 14 MG/DL (ref 8–26)
CHLORIDE BLD-SCNC: 100 MEQ/L (ref 98–109)
CREAT BLD-MCNC: 0.6 MG/DL (ref 0.5–1.2)
EOSINOPHIL # BLD AUTO: 0.2 THOU/MM3 (ref 0–0.4)
EOSINOPHIL NFR BLD AUTO: 2 % (ref 0–4)
ERYTHROCYTE [DISTWIDTH] IN BLOOD BY AUTOMATED COUNT: 12.9 % (ref 11.5–14.5)
GFR SERPL CREATININE-BSD FRML MDRD: > 60 ML/MIN/1.73M2
GLUCOSE BLD-MCNC: 68 MG/DL (ref 70–108)
HCT VFR BLD AUTO: 42.5 % (ref 37–47)
HGB BLD-MCNC: 14.3 GM/DL (ref 12–16)
IMM GRANULOCYTES # BLD AUTO: 0 THOU/MM3 (ref 0–0.07)
IMM GRANULOCYTES NFR BLD AUTO: 0 %
IONIZED CALCIUM, WHOLE BLOOD: 1.2 MMOL/L (ref 1.12–1.32)
LYMPHOCYTES # BLD AUTO: 1.7 THOU/MM3 (ref 1–4.8)
LYMPHOCYTES NFR BLD AUTO: 24 % (ref 15–47)
MCH RBC QN AUTO: 31.9 PG (ref 26–33)
MCHC RBC AUTO-ENTMCNC: 33.6 GM/DL (ref 32.2–35.5)
MCV RBC AUTO: 95 FL (ref 81–99)
MONOCYTES # BLD AUTO: 0.6 THOU/MM3 (ref 0.4–1.3)
MONOCYTES NFR BLD AUTO: 9 % (ref 0–12)
NEUTROPHILS NFR BLD AUTO: 64 % (ref 43–75)
PLATELET # BLD AUTO: 213 THOU/MM3 (ref 130–400)
PMV BLD AUTO: 10.9 FL (ref 9.4–12.4)
POTASSIUM BLD-SCNC: 3.9 MEQ/L (ref 3.5–4.9)
PROT SERPL-MCNC: 6.7 G/DL (ref 6.1–8)
RBC # BLD AUTO: 4.48 MILL/MM3 (ref 4.2–5.4)
SEGMENTED NEUTROPHILS ABSOLUTE COUNT: 4.4 THOU/MM3 (ref 1.8–7.7)
SODIUM BLD-SCNC: 138 MEQ/L (ref 138–146)
TOTAL CO2, WHOLE BLOOD: 28 MEQ/L (ref 23–33)
WBC # BLD AUTO: 6.8 THOU/MM3 (ref 4.8–10.8)

## 2023-09-20 PROCEDURE — 85025 COMPLETE CBC W/AUTO DIFF WBC: CPT

## 2023-09-20 PROCEDURE — 80047 BASIC METABLC PNL IONIZED CA: CPT

## 2023-09-20 PROCEDURE — 3079F DIAST BP 80-89 MM HG: CPT | Performed by: INTERNAL MEDICINE

## 2023-09-20 PROCEDURE — 1123F ACP DISCUSS/DSCN MKR DOCD: CPT | Performed by: INTERNAL MEDICINE

## 2023-09-20 PROCEDURE — 3077F SYST BP >= 140 MM HG: CPT | Performed by: INTERNAL MEDICINE

## 2023-09-20 PROCEDURE — 99214 OFFICE O/P EST MOD 30 MIN: CPT | Performed by: INTERNAL MEDICINE

## 2023-09-20 PROCEDURE — 80076 HEPATIC FUNCTION PANEL: CPT

## 2023-09-20 PROCEDURE — 99211 OFF/OP EST MAY X REQ PHY/QHP: CPT

## 2023-10-31 ENCOUNTER — NURSE ONLY (OUTPATIENT)
Dept: LAB | Age: 78
End: 2023-10-31

## 2023-10-31 DIAGNOSIS — F31.81 BIPOLAR II DISORDER IN FULL REMISSION (HCC): ICD-10-CM

## 2023-10-31 LAB
ALBUMIN SERPL BCG-MCNC: 4.4 G/DL (ref 3.5–5.1)
ALP SERPL-CCNC: 103 U/L (ref 38–126)
ALT SERPL W/O P-5'-P-CCNC: 13 U/L (ref 11–66)
ANION GAP SERPL CALC-SCNC: 10 MEQ/L (ref 8–16)
AST SERPL-CCNC: 18 U/L (ref 5–40)
BASOPHILS ABSOLUTE: 0.1 THOU/MM3 (ref 0–0.1)
BASOPHILS NFR BLD AUTO: 1.3 %
BILIRUB CONJ SERPL-MCNC: < 0.2 MG/DL (ref 0–0.3)
BILIRUB SERPL-MCNC: 0.5 MG/DL (ref 0.3–1.2)
BUN SERPL-MCNC: 8 MG/DL (ref 7–22)
CALCIUM SERPL-MCNC: 9.7 MG/DL (ref 8.5–10.5)
CHLORIDE SERPL-SCNC: 98 MEQ/L (ref 98–111)
CO2 SERPL-SCNC: 30 MEQ/L (ref 23–33)
CREAT SERPL-MCNC: 0.4 MG/DL (ref 0.4–1.2)
DEPRECATED RDW RBC AUTO: 45.4 FL (ref 35–45)
EOSINOPHIL NFR BLD AUTO: 1.9 %
EOSINOPHILS ABSOLUTE: 0.1 THOU/MM3 (ref 0–0.4)
ERYTHROCYTE [DISTWIDTH] IN BLOOD BY AUTOMATED COUNT: 12.8 % (ref 11.5–14.5)
GFR SERPL CREATININE-BSD FRML MDRD: > 60 ML/MIN/1.73M2
GLUCOSE SERPL-MCNC: 88 MG/DL (ref 70–108)
HCT VFR BLD AUTO: 45.4 % (ref 37–47)
HGB BLD-MCNC: 15.1 GM/DL (ref 12–16)
IMM GRANULOCYTES # BLD AUTO: 0.01 THOU/MM3 (ref 0–0.07)
IMM GRANULOCYTES NFR BLD AUTO: 0.2 %
LYMPHOCYTES ABSOLUTE: 0.9 THOU/MM3 (ref 1–4.8)
LYMPHOCYTES NFR BLD AUTO: 17.1 %
MCH RBC QN AUTO: 31.7 PG (ref 26–33)
MCHC RBC AUTO-ENTMCNC: 33.3 GM/DL (ref 32.2–35.5)
MCV RBC AUTO: 95.4 FL (ref 81–99)
MONOCYTES ABSOLUTE: 0.4 THOU/MM3 (ref 0.4–1.3)
MONOCYTES NFR BLD AUTO: 6.9 %
NEUTROPHILS NFR BLD AUTO: 72.6 %
NRBC BLD AUTO-RTO: 0 /100 WBC
PLATELET # BLD AUTO: 207 THOU/MM3 (ref 130–400)
PMV BLD AUTO: 11.5 FL (ref 9.4–12.4)
POTASSIUM SERPL-SCNC: 4.2 MEQ/L (ref 3.5–5.2)
PROT SERPL-MCNC: 7.3 G/DL (ref 6.1–8)
RBC # BLD AUTO: 4.76 MILL/MM3 (ref 4.2–5.4)
SEGMENTED NEUTROPHILS ABSOLUTE COUNT: 3.8 THOU/MM3 (ref 1.8–7.7)
SODIUM SERPL-SCNC: 138 MEQ/L (ref 135–145)
WBC # BLD AUTO: 5.3 THOU/MM3 (ref 4.8–10.8)

## 2023-12-06 ENCOUNTER — OFFICE VISIT (OUTPATIENT)
Dept: PSYCHIATRY | Age: 78
End: 2023-12-06

## 2023-12-06 DIAGNOSIS — F41.1 GAD (GENERALIZED ANXIETY DISORDER): ICD-10-CM

## 2023-12-06 DIAGNOSIS — F43.21 GRIEF: ICD-10-CM

## 2023-12-06 DIAGNOSIS — F31.81 BIPOLAR II DISORDER IN FULL REMISSION (HCC): Primary | ICD-10-CM

## 2023-12-06 RX ORDER — ARIPIPRAZOLE 10 MG/1
10 TABLET ORAL NIGHTLY
Qty: 90 TABLET | Refills: 0 | Status: SHIPPED | OUTPATIENT
Start: 2023-12-06

## 2023-12-06 NOTE — PROGRESS NOTES
Progress Note                                                                                   CC: Bipolar II D/O in full remission   AMANDA  Grief       HPI   Sumaya Carter is seen in person  for F/U and medication management. States her Abilify continues to work well for her. Deneis having any side effects. Good med compliance is reported. Denies Denies having depression or mood swings. Denies having anxiety. Denies feelings of harm towards self or others. Reports still dealing better with the passing of her sister. Evieetta Antis  denies need for counseling and reports still having strong family support. Reports still eating and sleeping well. Reports still getting along well with . Still denies need for counseling. Reports her son still lives with them. And reports her daughter and daughter and grandson visit regulary. Lipids reviewed drawn 1-30-23 reflect no critical abnormals. CBC with diff, GFR, BMP, Hepatic panel refiewed drawn 10-31-23 reflect no critical abnormals. Past Medical Hx:             Past Medical History:   Diagnosis Date    Cancer ([address])       Breast    Hypertension      No Known Allergies     Past Psychiatric Hx: Reports one past psych hospitalization on [facility] in 2007. Denies any past suicidal gestures. Prio client of Dr Yumiko Velasco here at [facility]        Family Hx:  Reports son has depression      Social Hx:  TOBACCO: Denies use of tobacco products   ETOH: Reports use of alcohol once yearly  DRUGS: Denies use of illicit substances   MARITAL STATUS:  over 48 years years first marriage Reports still gets along well. OCCUPATION: Retired from [email or web address]: Reports having high school diploma  LIVING SITUATION: Lives in [place], [place] with .  Had 3 children and 2 are living         MSE:  Level of consciousness: Alert  Appearance: in chair and fair grooming   Behavior/Motor: no abnormalities noted   Attitude toward examiner: cooperative   Speech: Normal

## 2024-03-06 ENCOUNTER — OFFICE VISIT (OUTPATIENT)
Dept: PSYCHIATRY | Age: 79
End: 2024-03-06

## 2024-03-06 DIAGNOSIS — F43.21 GRIEF: ICD-10-CM

## 2024-03-06 DIAGNOSIS — F41.1 GAD (GENERALIZED ANXIETY DISORDER): ICD-10-CM

## 2024-03-06 DIAGNOSIS — F31.81 BIPOLAR II DISORDER IN FULL REMISSION (HCC): Primary | ICD-10-CM

## 2024-03-06 RX ORDER — ARIPIPRAZOLE 10 MG/1
10 TABLET ORAL NIGHTLY
Qty: 90 TABLET | Refills: 0 | Status: SHIPPED | OUTPATIENT
Start: 2024-03-06

## 2024-03-06 NOTE — PROGRESS NOTES
Progress Note                                                                                   CC: Bipolar II D/O in full remission   AMANDA  Grief       HPI   Arabella is seen in person  for F/U and medication management. Reports Abilify is still working \"good\"  Deneis having any side effects. Good med compliance is reported. Denies Denies having depression or mood swings. Denies having anxiety.  Denies feelings of harm towards self or others. Reports dealing much better with passing of sister.  Continues to deny need for counseling and reports still having strong family support.  Reports appetite and sleep are still \"good\" Reports still getting along well with . Reports her son still lives with them. And reports her daughter and daughter and grandson xtill visit regulary.  Lipids reviewed drawn 1-30-23 reflect no critical abnormals. CBC with diff, GFR, BMP, Hepatic panel refiewed drawn 10-31-23 reflect no critical abnormals. Pt request to be seen every 6 mso instead of every 3 mos. Pt has shown sustained stability.         Past Medical Hx:             Past Medical History:   Diagnosis Date    Cancer (HCC)       Breast    Hypertension      No Known Allergies     Past Psychiatric Hx: Reports one past psych hospitalization on [facility] in 2007. Denies any past suicidal gestures. Prio client of Dr Henry [facility]        Family Hx:  Reports son has depression      Social Hx:  TOBACCO: Denies use of tobacco products   ETOH: Reports use of alcohol once yearly  DRUGS: Denies use of illicit substances   MARITAL STATUS:  over 50 years years first marriage Reports still gets along well.   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 Had 3 children and 2 are living         MSE:  Level of consciousness: Alert  Appearance: in chair and fair grooming   Behavior/Motor: no abnormalities noted

## 2024-06-05 RX ORDER — ARIPIPRAZOLE 10 MG/1
10 TABLET ORAL NIGHTLY
Qty: 90 TABLET | Refills: 0 | Status: SHIPPED | OUTPATIENT
Start: 2024-06-05

## 2024-06-05 NOTE — TELEPHONE ENCOUNTER
Arabella called into the office stating that she needs a refill on her Abilify 10mg;#90 with 0 refills;last with a start date of 03/06/24.    Medication is pending your approval for a 90 day supply with 0 refills; she is scheduled to return on 09/04/24. Last seen on 03/06/24.

## 2024-09-04 ENCOUNTER — OFFICE VISIT (OUTPATIENT)
Dept: PSYCHIATRY | Age: 79
End: 2024-09-04
Payer: MEDICARE

## 2024-09-04 DIAGNOSIS — F41.1 GAD (GENERALIZED ANXIETY DISORDER): ICD-10-CM

## 2024-09-04 DIAGNOSIS — F31.81 BIPOLAR II DISORDER IN FULL REMISSION (HCC): Primary | ICD-10-CM

## 2024-09-04 DIAGNOSIS — F43.21 GRIEF: ICD-10-CM

## 2024-09-04 PROCEDURE — 99214 OFFICE O/P EST MOD 30 MIN: CPT | Performed by: NURSE PRACTITIONER

## 2024-09-04 PROCEDURE — 1123F ACP DISCUSS/DSCN MKR DOCD: CPT | Performed by: NURSE PRACTITIONER

## 2024-09-04 RX ORDER — ARIPIPRAZOLE 10 MG/1
10 TABLET ORAL NIGHTLY
Qty: 90 TABLET | Refills: 1 | Status: SHIPPED | OUTPATIENT
Start: 2024-09-04

## 2024-09-04 NOTE — PROGRESS NOTES
Progress Note                                                                                   CC: Bipolar II D/O in full remission   AMANDA  Grief       HPI   Arabella is seen in person  for F/U and medication management. States her Abilify continues to work well. Denies having any side effects. Good med compliance is reported. Denies Denies having depression or mood swings. Denies having anxiety.  Denies feelings of harm towards self or others. Reports continues to do well with passing of sister.  Continues to deny need for counseling and reports still having strong family support.  Reports still eating and sleeping well. Reports still getting along well with . Reports her son still lives with them. Reports they stay home a lot to keep an eye on their son that tried to commit suicide 7 years ago  Lipids reviewed drawn 1-30-23 reflect no critical abnormals. CBC with diff, GFR, BMP, Hepatic panel refiewed drawn 10-31-23 reflect no critical abnormals. Reports did not get Lipids done yet.         Past Medical Hx:             Past Medical History:   Diagnosis Date    Cancer (HCC)       Breast    Hypertension      No Known Allergies     Past Psychiatric Hx: Reports one past psych hospitalization on [facility] in 2007. Denies any past suicidal gestures. Prio client of Dr Henry here at [facility]        Family Hx:  Reports son has depression and tried to commit suicde 7 years ago      Social Hx:  TOBACCO: Denies use of tobacco products   ETOH: Reports use of alcohol once yearly  DRUGS: Denies use of illicit substances   MARITAL STATUS:  over 50 years years first marriage Reports still gets along well.   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 Had 3 children and 2 are living         MSE:  Level of consciousness: Alert  Appearance: in chair and fair grooming   Behavior/Motor: no abnormalities noted

## 2024-09-16 ENCOUNTER — TELEPHONE (OUTPATIENT)
Dept: PSYCHIATRY | Age: 79
End: 2024-09-16

## 2024-09-16 ENCOUNTER — LAB (OUTPATIENT)
Dept: LAB | Age: 79
End: 2024-09-16

## 2024-09-16 DIAGNOSIS — F43.21 GRIEF: ICD-10-CM

## 2024-09-16 DIAGNOSIS — F31.81 BIPOLAR II DISORDER IN FULL REMISSION (HCC): ICD-10-CM

## 2024-09-16 DIAGNOSIS — F41.1 GAD (GENERALIZED ANXIETY DISORDER): ICD-10-CM

## 2024-09-16 LAB
ALBUMIN SERPL BCG-MCNC: 4.2 G/DL (ref 3.5–5.1)
ALP SERPL-CCNC: 84 U/L (ref 38–126)
ALT SERPL W/O P-5'-P-CCNC: 15 U/L (ref 11–66)
ANION GAP SERPL CALC-SCNC: 8 MEQ/L (ref 8–16)
AST SERPL-CCNC: 18 U/L (ref 5–40)
BASOPHILS ABSOLUTE: 0.1 THOU/MM3 (ref 0–0.1)
BASOPHILS NFR BLD AUTO: 1.7 %
BILIRUB SERPL-MCNC: 0.6 MG/DL (ref 0.3–1.2)
BUN SERPL-MCNC: 10 MG/DL (ref 7–22)
CALCIUM SERPL-MCNC: 9.4 MG/DL (ref 8.5–10.5)
CHLORIDE SERPL-SCNC: 99 MEQ/L (ref 98–111)
CHOLEST SERPL-MCNC: 146 MG/DL (ref 100–199)
CO2 SERPL-SCNC: 32 MEQ/L (ref 23–33)
CREAT SERPL-MCNC: 0.5 MG/DL (ref 0.4–1.2)
DEPRECATED RDW RBC AUTO: 46.8 FL (ref 35–45)
EOSINOPHIL NFR BLD AUTO: 2.8 %
EOSINOPHILS ABSOLUTE: 0.2 THOU/MM3 (ref 0–0.4)
ERYTHROCYTE [DISTWIDTH] IN BLOOD BY AUTOMATED COUNT: 13 % (ref 11.5–14.5)
GFR SERPL CREATININE-BSD FRML MDRD: > 90 ML/MIN/1.73M2
GLUCOSE SERPL-MCNC: 97 MG/DL (ref 70–108)
HCT VFR BLD AUTO: 46.1 % (ref 37–47)
HDLC SERPL-MCNC: 64 MG/DL
HGB BLD-MCNC: 15.4 GM/DL (ref 12–16)
IMM GRANULOCYTES # BLD AUTO: 0.02 THOU/MM3 (ref 0–0.07)
IMM GRANULOCYTES NFR BLD AUTO: 0.3 %
LDLC SERPL CALC-MCNC: 67 MG/DL
LYMPHOCYTES ABSOLUTE: 1.8 THOU/MM3 (ref 1–4.8)
LYMPHOCYTES NFR BLD AUTO: 30.4 %
MCH RBC QN AUTO: 32.4 PG (ref 26–33)
MCHC RBC AUTO-ENTMCNC: 33.4 GM/DL (ref 32.2–35.5)
MCV RBC AUTO: 96.8 FL (ref 81–99)
MONOCYTES ABSOLUTE: 0.5 THOU/MM3 (ref 0.4–1.3)
MONOCYTES NFR BLD AUTO: 8.7 %
NEUTROPHILS ABSOLUTE: 3.4 THOU/MM3 (ref 1.8–7.7)
NEUTROPHILS NFR BLD AUTO: 56.1 %
NRBC BLD AUTO-RTO: 0 /100 WBC
PLATELET # BLD AUTO: 289 THOU/MM3 (ref 130–400)
PMV BLD AUTO: 11.5 FL (ref 9.4–12.4)
POTASSIUM SERPL-SCNC: 4.6 MEQ/L (ref 3.5–5.2)
PROT SERPL-MCNC: 7.4 G/DL (ref 6.1–8)
RBC # BLD AUTO: 4.76 MILL/MM3 (ref 4.2–5.4)
SODIUM SERPL-SCNC: 139 MEQ/L (ref 135–145)
TRIGL SERPL-MCNC: 77 MG/DL (ref 0–199)
TSH SERPL DL<=0.005 MIU/L-ACNC: 4.68 UIU/ML (ref 0.4–4.2)
WBC # BLD AUTO: 6 THOU/MM3 (ref 4.8–10.8)

## 2024-10-09 ENCOUNTER — HOSPITAL ENCOUNTER (OUTPATIENT)
Dept: INFUSION THERAPY | Age: 79
Discharge: HOME | End: 2024-10-09
Payer: MEDICARE

## 2024-10-09 ENCOUNTER — OFFICE VISIT (OUTPATIENT)
Dept: ONCOLOGY | Age: 79
End: 2024-10-09

## 2024-10-09 VITALS
OXYGEN SATURATION: 98 % | RESPIRATION RATE: 16 BRPM | SYSTOLIC BLOOD PRESSURE: 144 MMHG | TEMPERATURE: 97.7 F | HEART RATE: 66 BPM | DIASTOLIC BLOOD PRESSURE: 80 MMHG

## 2024-10-09 VITALS
OXYGEN SATURATION: 98 % | BODY MASS INDEX: 24.86 KG/M2 | DIASTOLIC BLOOD PRESSURE: 80 MMHG | HEART RATE: 66 BPM | SYSTOLIC BLOOD PRESSURE: 144 MMHG | TEMPERATURE: 97.7 F | WEIGHT: 177.6 LBS | HEIGHT: 71 IN | RESPIRATION RATE: 16 BRPM

## 2024-10-09 DIAGNOSIS — C50.911 PRIMARY MALIGNANT NEOPLASM OF RIGHT BREAST WITH STAGE 3 NODAL METASTASIS PER AMERICAN JOINT COMMITTEE ON CANCER 7TH EDITION (N3) (HCC): Primary | ICD-10-CM

## 2024-10-09 DIAGNOSIS — C50.911 PRIMARY MALIGNANT NEOPLASM OF RIGHT BREAST WITH STAGE 3 NODAL METASTASIS PER AMERICAN JOINT COMMITTEE ON CANCER 7TH EDITION (N3) (HCC): ICD-10-CM

## 2024-10-09 DIAGNOSIS — C77.9 PRIMARY MALIGNANT NEOPLASM OF RIGHT BREAST WITH STAGE 3 NODAL METASTASIS PER AMERICAN JOINT COMMITTEE ON CANCER 7TH EDITION (N3) (HCC): ICD-10-CM

## 2024-10-09 DIAGNOSIS — C77.9 PRIMARY MALIGNANT NEOPLASM OF RIGHT BREAST WITH STAGE 3 NODAL METASTASIS PER AMERICAN JOINT COMMITTEE ON CANCER 7TH EDITION (N3) (HCC): Primary | ICD-10-CM

## 2024-10-09 DIAGNOSIS — Z85.3 HISTORY OF RIGHT BREAST CANCER: Primary | ICD-10-CM

## 2024-10-09 LAB
ALBUMIN SERPL BCG-MCNC: 4 G/DL (ref 3.5–5.1)
ALP SERPL-CCNC: 87 U/L (ref 38–126)
ALT SERPL W/O P-5'-P-CCNC: 15 U/L (ref 11–66)
AST SERPL-CCNC: 21 U/L (ref 5–40)
BASOPHILS # BLD AUTO: 0.1 THOU/MM3 (ref 0–0.1)
BASOPHILS NFR BLD AUTO: 1 % (ref 0–3)
BILIRUB CONJ SERPL-MCNC: < 0.1 MG/DL (ref 0.1–13.8)
BILIRUB SERPL-MCNC: 0.3 MG/DL (ref 0.3–1.2)
BUN BLDP-MCNC: 9 MG/DL (ref 8–26)
CHLORIDE BLD-SCNC: 97 MEQ/L (ref 98–109)
CREAT BLD-MCNC: 0.6 MG/DL (ref 0.5–1.2)
EOSINOPHIL # BLD AUTO: 0.3 THOU/MM3 (ref 0–0.4)
EOSINOPHIL NFR BLD AUTO: 4 % (ref 0–4)
ERYTHROCYTE [DISTWIDTH] IN BLOOD BY AUTOMATED COUNT: 13.1 % (ref 11.5–14.5)
GFR SERPL CREATININE-BSD FRML MDRD: > 90 ML/MIN/1.73M2
GLUCOSE BLD-MCNC: 101 MG/DL (ref 70–108)
HCT VFR BLD AUTO: 42.3 % (ref 37–47)
HGB BLD-MCNC: 14.1 GM/DL (ref 12–16)
IMM GRANULOCYTES # BLD AUTO: 0.02 THOU/MM3 (ref 0–0.07)
IMM GRANULOCYTES NFR BLD AUTO: 0 %
IONIZED CALCIUM, WHOLE BLOOD: 1.17 MMOL/L (ref 1.12–1.32)
LYMPHOCYTES # BLD AUTO: 1.9 THOU/MM3 (ref 1–4.8)
LYMPHOCYTES NFR BLD AUTO: 27 % (ref 15–47)
MCH RBC QN AUTO: 32.2 PG (ref 26–33)
MCHC RBC AUTO-ENTMCNC: 33.3 GM/DL (ref 32.2–35.5)
MCV RBC AUTO: 97 FL (ref 81–99)
MONOCYTES # BLD AUTO: 0.5 THOU/MM3 (ref 0.4–1.3)
MONOCYTES NFR BLD AUTO: 7 % (ref 0–12)
NEUTROPHILS ABSOLUTE: 4.3 THOU/MM3 (ref 1.8–7.7)
NEUTROPHILS NFR BLD AUTO: 60 % (ref 43–75)
PLATELET # BLD AUTO: 245 THOU/MM3 (ref 130–400)
PMV BLD AUTO: 10.5 FL (ref 9.4–12.4)
POTASSIUM BLD-SCNC: 4.3 MEQ/L (ref 3.5–4.9)
PROT SERPL-MCNC: 6.8 G/DL (ref 6.1–8)
RBC # BLD AUTO: 4.38 MILL/MM3 (ref 4.2–5.4)
SODIUM BLD-SCNC: 135 MEQ/L (ref 138–146)
TOTAL CO2, WHOLE BLOOD: 30 MEQ/L (ref 23–33)
WBC # BLD AUTO: 7.1 THOU/MM3 (ref 4.8–10.8)

## 2024-10-09 PROCEDURE — 80076 HEPATIC FUNCTION PANEL: CPT

## 2024-10-09 PROCEDURE — 85025 COMPLETE CBC W/AUTO DIFF WBC: CPT

## 2024-10-09 PROCEDURE — 86300 IMMUNOASSAY TUMOR CA 15-3: CPT

## 2024-10-09 PROCEDURE — 99211 OFF/OP EST MAY X REQ PHY/QHP: CPT

## 2024-10-09 PROCEDURE — 80047 BASIC METABLC PNL IONIZED CA: CPT

## 2024-10-09 RX ORDER — NITROFURANTOIN 25; 75 MG/1; MG/1
100 CAPSULE ORAL 2 TIMES DAILY
COMMUNITY

## 2024-10-09 NOTE — PROGRESS NOTES
[facility]  [facility]  [address]  [address]  [place] [address]  Dept: [phone]  Loc: [phone]       Visit Date:10/9/2024     Arabella Sky is a 79 y.o. female who presents today for:   Chief Complaint   Patient presents with    Follow-up     Primary malignant neoplasm of right breast with stage 3 angel metastasis per American Joint Committee on Cancer 7th edition (N3) (HCC)        HPI:   Arabella Sky is a 79 y.o. female with right breast cancer.  The patient has a history of depression, TIA, hypothyroidism, HTN and hyperlipidemia.    07/14/2014 the patient underwent a diagnostic right mammogram for evaluation of a palpable right breast mass.  Findings confirmed a dense roughly oval shaped spiculated nodular density measuring 3.1 cm.  Findings suggestive of a neoplastic mass at the 12 o'clock position.  Recommended further evaluation with ultrasound.    Ultrasound results confirmed a spiculated nodular density measuring roughly 2.4 cm in diameter corresponding to the clinically palpable nodular density in the upper outer quadrant of the right breast with significant amount of acoustic shadowing, representing a malignant neoplasm.  Recommended further evaluation with ultrasound-guided biopsy.    07/25/2014 right breast biopsy pathology results confirmed invasive ductal carcinoma, grade 2.  ER positive, [place] positive and HER2/laura negative    08/18/2014 the patient underwent a lumpectomy with right axillary lymph node dissection.  Pathology results confirmed infiltrating ductal carcinoma, grade 2, measuring 3.4 cm.  3 of 12 lymph nodes positive for malignancy.    09/03/2014 the patient was seen for initial evaluation with Dr. Padilla.  Treatment options were discussed with the patient and her  regarding the possibility of entering a clinical trial.    Oncotype DX recurrence score 18    The patient elected to proceed in clinical trial and was

## 2024-10-11 LAB — CANCER AG27-29 SERPL-ACNC: 14 U/ML (ref 0–38)

## 2025-03-05 ENCOUNTER — OFFICE VISIT (OUTPATIENT)
Dept: PSYCHIATRY | Age: 80
End: 2025-03-05

## 2025-03-05 DIAGNOSIS — F31.81 BIPOLAR II DISORDER IN FULL REMISSION (HCC): Primary | ICD-10-CM

## 2025-03-05 DIAGNOSIS — F43.21 GRIEF: ICD-10-CM

## 2025-03-05 DIAGNOSIS — F41.1 GAD (GENERALIZED ANXIETY DISORDER): ICD-10-CM

## 2025-03-05 RX ORDER — ARIPIPRAZOLE 10 MG/1
10 TABLET ORAL NIGHTLY
Qty: 90 TABLET | Refills: 1 | Status: SHIPPED | OUTPATIENT
Start: 2025-03-05

## 2025-03-05 ASSESSMENT — PATIENT HEALTH QUESTIONNAIRE - PHQ9
SUM OF ALL RESPONSES TO PHQ QUESTIONS 1-9: 0
5. POOR APPETITE OR OVEREATING: NOT AT ALL
4. FEELING TIRED OR HAVING LITTLE ENERGY: NOT AT ALL
2. FEELING DOWN, DEPRESSED OR HOPELESS: NOT AT ALL
1. LITTLE INTEREST OR PLEASURE IN DOING THINGS: NOT AT ALL
SUM OF ALL RESPONSES TO PHQ QUESTIONS 1-9: 0
SUM OF ALL RESPONSES TO PHQ QUESTIONS 1-9: 0
8. MOVING OR SPEAKING SO SLOWLY THAT OTHER PEOPLE COULD HAVE NOTICED. OR THE OPPOSITE, BEING SO FIGETY OR RESTLESS THAT YOU HAVE BEEN MOVING AROUND A LOT MORE THAN USUAL: NOT AT ALL
9. THOUGHTS THAT YOU WOULD BE BETTER OFF DEAD, OR OF HURTING YOURSELF: NOT AT ALL
10. IF YOU CHECKED OFF ANY PROBLEMS, HOW DIFFICULT HAVE THESE PROBLEMS MADE IT FOR YOU TO DO YOUR WORK, TAKE CARE OF THINGS AT HOME, OR GET ALONG WITH OTHER PEOPLE: NOT DIFFICULT AT ALL
7. TROUBLE CONCENTRATING ON THINGS, SUCH AS READING THE NEWSPAPER OR WATCHING TELEVISION: NOT AT ALL
6. FEELING BAD ABOUT YOURSELF - OR THAT YOU ARE A FAILURE OR HAVE LET YOURSELF OR YOUR FAMILY DOWN: NOT AT ALL
SUM OF ALL RESPONSES TO PHQ QUESTIONS 1-9: 0
3. TROUBLE FALLING OR STAYING ASLEEP: NOT AT ALL

## 2025-03-05 ASSESSMENT — ANXIETY QUESTIONNAIRES
1. FEELING NERVOUS, ANXIOUS, OR ON EDGE: NOT AT ALL
5. BEING SO RESTLESS THAT IT IS HARD TO SIT STILL: NOT AT ALL
6. BECOMING EASILY ANNOYED OR IRRITABLE: NOT AT ALL
GAD7 TOTAL SCORE: 0
IF YOU CHECKED OFF ANY PROBLEMS ON THIS QUESTIONNAIRE, HOW DIFFICULT HAVE THESE PROBLEMS MADE IT FOR YOU TO DO YOUR WORK, TAKE CARE OF THINGS AT HOME, OR GET ALONG WITH OTHER PEOPLE: NOT DIFFICULT AT ALL
3. WORRYING TOO MUCH ABOUT DIFFERENT THINGS: NOT AT ALL
7. FEELING AFRAID AS IF SOMETHING AWFUL MIGHT HAPPEN: NOT AT ALL
2. NOT BEING ABLE TO STOP OR CONTROL WORRYING: NOT AT ALL
4. TROUBLE RELAXING: NOT AT ALL

## 2025-09-03 ENCOUNTER — OFFICE VISIT (OUTPATIENT)
Dept: PSYCHIATRY | Age: 80
End: 2025-09-03
Payer: MEDICARE

## 2025-09-03 DIAGNOSIS — F31.81 BIPOLAR II DISORDER IN FULL REMISSION (HCC): Primary | ICD-10-CM

## 2025-09-03 DIAGNOSIS — F41.1 GAD (GENERALIZED ANXIETY DISORDER): ICD-10-CM

## 2025-09-03 DIAGNOSIS — F43.21 GRIEF: ICD-10-CM

## 2025-09-03 PROCEDURE — 99214 OFFICE O/P EST MOD 30 MIN: CPT | Performed by: NURSE PRACTITIONER

## 2025-09-03 PROCEDURE — 1123F ACP DISCUSS/DSCN MKR DOCD: CPT | Performed by: NURSE PRACTITIONER

## 2025-09-03 RX ORDER — ARIPIPRAZOLE 10 MG/1
10 TABLET ORAL NIGHTLY
Qty: 90 TABLET | Refills: 1 | Status: SHIPPED | OUTPATIENT
Start: 2025-09-03

## 2025-09-03 ASSESSMENT — PATIENT HEALTH QUESTIONNAIRE - PHQ9
4. FEELING TIRED OR HAVING LITTLE ENERGY: NOT AT ALL
5. POOR APPETITE OR OVEREATING: NOT AT ALL
2. FEELING DOWN, DEPRESSED OR HOPELESS: NOT AT ALL
3. TROUBLE FALLING OR STAYING ASLEEP: NOT AT ALL
8. MOVING OR SPEAKING SO SLOWLY THAT OTHER PEOPLE COULD HAVE NOTICED. OR THE OPPOSITE, BEING SO FIGETY OR RESTLESS THAT YOU HAVE BEEN MOVING AROUND A LOT MORE THAN USUAL: NOT AT ALL
SUM OF ALL RESPONSES TO PHQ QUESTIONS 1-9: 0
7. TROUBLE CONCENTRATING ON THINGS, SUCH AS READING THE NEWSPAPER OR WATCHING TELEVISION: NOT AT ALL
SUM OF ALL RESPONSES TO PHQ QUESTIONS 1-9: 0
SUM OF ALL RESPONSES TO PHQ QUESTIONS 1-9: 0
9. THOUGHTS THAT YOU WOULD BE BETTER OFF DEAD, OR OF HURTING YOURSELF: NOT AT ALL
1. LITTLE INTEREST OR PLEASURE IN DOING THINGS: NOT AT ALL
6. FEELING BAD ABOUT YOURSELF - OR THAT YOU ARE A FAILURE OR HAVE LET YOURSELF OR YOUR FAMILY DOWN: NOT AT ALL
SUM OF ALL RESPONSES TO PHQ QUESTIONS 1-9: 0